# Patient Record
Sex: MALE | Race: WHITE | NOT HISPANIC OR LATINO | Employment: OTHER | URBAN - METROPOLITAN AREA
[De-identification: names, ages, dates, MRNs, and addresses within clinical notes are randomized per-mention and may not be internally consistent; named-entity substitution may affect disease eponyms.]

---

## 2017-05-03 ENCOUNTER — GENERIC CONVERSION - ENCOUNTER (OUTPATIENT)
Dept: OTHER | Facility: OTHER | Age: 82
End: 2017-05-03

## 2017-09-13 ENCOUNTER — ALLSCRIPTS OFFICE VISIT (OUTPATIENT)
Dept: OTHER | Facility: OTHER | Age: 82
End: 2017-09-13

## 2017-09-14 ENCOUNTER — GENERIC CONVERSION - ENCOUNTER (OUTPATIENT)
Dept: OTHER | Facility: OTHER | Age: 82
End: 2017-09-14

## 2017-09-14 ENCOUNTER — ALLSCRIPTS OFFICE VISIT (OUTPATIENT)
Dept: OTHER | Facility: OTHER | Age: 82
End: 2017-09-14

## 2017-10-11 ENCOUNTER — LAB CONVERSION - ENCOUNTER (OUTPATIENT)
Dept: OTHER | Facility: OTHER | Age: 82
End: 2017-10-11

## 2017-10-11 LAB
A/G RATIO (HISTORICAL): 1.7 (CALC) (ref 1–2.5)
ALBUMIN SERPL BCP-MCNC: 4.3 G/DL (ref 3.6–5.1)
ALP SERPL-CCNC: 76 U/L (ref 40–115)
ALT SERPL W P-5'-P-CCNC: 20 U/L (ref 9–46)
AST SERPL W P-5'-P-CCNC: 22 U/L (ref 10–35)
BILIRUB SERPL-MCNC: 0.7 MG/DL (ref 0.2–1.2)
BUN SERPL-MCNC: 19 MG/DL (ref 7–25)
BUN/CREA RATIO (HISTORICAL): ABNORMAL (CALC) (ref 6–22)
CALCIUM SERPL-MCNC: 9.4 MG/DL (ref 8.6–10.3)
CHLORIDE SERPL-SCNC: 104 MMOL/L (ref 98–110)
CHOLEST SERPL-MCNC: 203 MG/DL
CHOLEST/HDLC SERPL: 3.3 (CALC)
CO2 SERPL-SCNC: 29 MMOL/L (ref 20–31)
CREAT SERPL-MCNC: 0.97 MG/DL (ref 0.7–1.11)
EGFR AFRICAN AMERICAN (HISTORICAL): 85 ML/MIN/1.73M2
EGFR-AMERICAN CALC (HISTORICAL): 73 ML/MIN/1.73M2
GAMMA GLOBULIN (HISTORICAL): 2.6 G/DL (CALC) (ref 1.9–3.7)
GLUCOSE (HISTORICAL): 100 MG/DL (ref 65–99)
HDLC SERPL-MCNC: 61 MG/DL
LDL CHOLESTEROL (HISTORICAL): 121 MG/DL (CALC)
NON-HDL-CHOL (CHOL-HDL) (HISTORICAL): 142 MG/DL (CALC)
POTASSIUM SERPL-SCNC: 4.5 MMOL/L (ref 3.5–5.3)
SODIUM SERPL-SCNC: 139 MMOL/L (ref 135–146)
TOTAL PROTEIN (HISTORICAL): 6.9 G/DL (ref 6.1–8.1)
TRIGL SERPL-MCNC: 100 MG/DL

## 2017-10-12 ENCOUNTER — LAB CONVERSION - ENCOUNTER (OUTPATIENT)
Dept: OTHER | Facility: OTHER | Age: 82
End: 2017-10-12

## 2017-10-12 LAB
A/G RATIO (HISTORICAL): 1.7 (CALC) (ref 1–2.5)
ALBUMIN SERPL BCP-MCNC: 4.3 G/DL (ref 3.6–5.1)
ALP SERPL-CCNC: 76 U/L (ref 40–115)
ALT SERPL W P-5'-P-CCNC: 20 U/L (ref 9–46)
AST SERPL W P-5'-P-CCNC: 22 U/L (ref 10–35)
BILIRUB SERPL-MCNC: 0.7 MG/DL (ref 0.2–1.2)
BUN SERPL-MCNC: 19 MG/DL (ref 7–25)
BUN/CREA RATIO (HISTORICAL): ABNORMAL (CALC) (ref 6–22)
CALCIUM SERPL-MCNC: 9.4 MG/DL (ref 8.6–10.3)
CHLORIDE SERPL-SCNC: 104 MMOL/L (ref 98–110)
CHOLEST SERPL-MCNC: 203 MG/DL
CHOLEST/HDLC SERPL: 3.3 (CALC)
CO2 SERPL-SCNC: 29 MMOL/L (ref 20–31)
CREAT SERPL-MCNC: 0.97 MG/DL (ref 0.7–1.11)
DEPRECATED RDW RBC AUTO: 13.6 % (ref 11–15)
EGFR AFRICAN AMERICAN (HISTORICAL): 85 ML/MIN/1.73M2
EGFR-AMERICAN CALC (HISTORICAL): 73 ML/MIN/1.73M2
GAMMA GLOBULIN (HISTORICAL): 2.6 G/DL (CALC) (ref 1.9–3.7)
GLUCOSE (HISTORICAL): 100 MG/DL (ref 65–99)
HCT VFR BLD AUTO: 49.4 % (ref 38.5–50)
HDLC SERPL-MCNC: 61 MG/DL
HGB BLD-MCNC: 16.7 G/DL (ref 13.2–17.1)
LDL CHOLESTEROL (HISTORICAL): 121 MG/DL (CALC)
MCH RBC QN AUTO: 31.2 PG (ref 27–33)
MCHC RBC AUTO-ENTMCNC: 33.8 G/DL (ref 32–36)
MCV RBC AUTO: 92.2 FL (ref 80–100)
NON-HDL-CHOL (CHOL-HDL) (HISTORICAL): 142 MG/DL (CALC)
PLATELET # BLD AUTO: 145 THOUSAND/UL (ref 140–400)
PMV BLD AUTO: 11 FL (ref 7.5–12.5)
POTASSIUM SERPL-SCNC: 4.5 MMOL/L (ref 3.5–5.3)
RBC # BLD AUTO: 5.36 MILLION/UL (ref 4.2–5.8)
SODIUM SERPL-SCNC: 139 MMOL/L (ref 135–146)
TOTAL PROTEIN (HISTORICAL): 6.9 G/DL (ref 6.1–8.1)
TRIGL SERPL-MCNC: 100 MG/DL
TSH SERPL DL<=0.05 MIU/L-ACNC: 3.5 MIU/L (ref 0.4–4.5)
WBC # BLD AUTO: 5.4 THOUSAND/UL (ref 3.8–10.8)

## 2017-10-16 ENCOUNTER — ALLSCRIPTS OFFICE VISIT (OUTPATIENT)
Dept: OTHER | Facility: OTHER | Age: 82
End: 2017-10-16

## 2017-10-16 DIAGNOSIS — R01.1 CARDIAC MURMUR: ICD-10-CM

## 2017-11-17 ENCOUNTER — GENERIC CONVERSION - ENCOUNTER (OUTPATIENT)
Dept: OTHER | Facility: OTHER | Age: 82
End: 2017-11-17

## 2017-11-17 ENCOUNTER — HOSPITAL ENCOUNTER (OUTPATIENT)
Dept: NON INVASIVE DIAGNOSTICS | Facility: HOSPITAL | Age: 82
Discharge: HOME | End: 2017-11-17
Attending: FAMILY MEDICINE
Payer: COMMERCIAL

## 2017-11-17 DIAGNOSIS — R01.1 CARDIAC MURMUR: ICD-10-CM

## 2017-11-17 PROCEDURE — 93306 TTE W/DOPPLER COMPLETE: CPT

## 2018-01-11 ENCOUNTER — GENERIC CONVERSION - ENCOUNTER (OUTPATIENT)
Dept: OTHER | Facility: OTHER | Age: 83
End: 2018-01-11

## 2018-01-11 NOTE — PROGRESS NOTES
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)   9  Plantar fasciitis (728 71) (M72 2)   10  Primary hypothyroidism (244 9) (E03 9)   11  Prostate cancer screening (V76 44) (Z12 5)   12  PVCs (premature ventricular contractions) (427 69) (I49 3)   13  Right knee pain (719 46) (M25 561)   14  Screening for cardiovascular condition (V81 2) (Z13 6)   15  Screening for thyroid disorder (V77 0) (Z13 29)   16  Seborrheic keratosis (702 19) (L82 1)   17  Throat pain in adult (784 1) (R07 0)    Current Meds   1  Atorvastatin Calcium 10 MG Oral Tablet; TAKE 1 TABLET DAILY  Requested for:   63Stc9209; Last Rx:[id number] Ordered   2  Levothyroxine Sodium 75 MCG Oral Tablet; TAKE 1 TABLET DAILY  Requested for:   62Btt5698; Last Rx:[id number] Ordered    Allergies    1  Omnicef CAPS    2  No Known Latex Allergies    Vitals  Signs    Systolic: 556, LUE, Sitting  Diastolic: 88, LUE, Sitting    Discussion/Summary    Cont to monitor instructions        Signatures   Electronically signed by : TOÑO Donohue ; Sep 20 2016  9:37AM EST                       (Author)

## 2018-01-13 NOTE — MISCELLANEOUS
Message  Phone call to patient as his blood pressure was 78/52 this am in the office  Patient reported that he is feeling fine, denied any dizziness or feeling lightheaded  He is eating and drinking fine  Asked patient to come into the office tomorrow morning for a BP check, he will come into the office about 8:30-9:00 tomorrow morning  Vahid Corona RN      Active Problems    1  Benign essential hypertension (401 1) (I10)   2  Body mass index (BMI) 24 0-24 9, adult (V85 1) (Z68 24)   3  Elevated prostate specific antigen (PSA) (790 93) (R97 20)   4  Encounter for screening for malignant neoplasm of colon (V76 51) (Z12 11)   5  Hyperlipidemia (272 4) (E78 5)   6  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)   7  Lumbago (724 2) (M54 5)   8  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)   9  Plantar fasciitis (728 71) (M72 2)   10  Primary hypothyroidism (244 9) (E03 9)   11  Prostate cancer screening (V76 44) (Z12 5)   12  PVCs (premature ventricular contractions) (427 69) (I49 3)   13  Right knee pain (719 46) (M25 561)   14  Screening for cardiovascular condition (V81 2) (Z13 6)   15  Screening for thyroid disorder (V77 0) (Z13 29)   16  Seborrheic keratosis (702 19) (L82 1)   17  Throat pain in adult (784 1) (R07 0)   18  Tracheostomy status (V44 0) (Z93 0)    Current Meds   1  Atorvastatin Calcium 10 MG Oral Tablet; TAKE 1 TABLET DAILY  Requested for:   51BBY3538; Last HP:[id number] Ordered   2  Levothyroxine Sodium 75 MCG Oral Tablet; TAKE 1 TABLET DAILY  Requested for:   27ILG8975; Last TY:[id number] Ordered    Allergies    1  Omnicef CAPS    2  No Known Latex Allergies    Signatures   Electronically signed by :  WALDEMAR Adams ; Sep 13 2017  3:36PM EST                       (Author)

## 2018-01-15 VITALS
RESPIRATION RATE: 18 BRPM | HEIGHT: 68 IN | BODY MASS INDEX: 25.34 KG/M2 | DIASTOLIC BLOOD PRESSURE: 78 MMHG | SYSTOLIC BLOOD PRESSURE: 108 MMHG | OXYGEN SATURATION: 94 % | WEIGHT: 167.19 LBS | HEART RATE: 82 BPM

## 2018-01-16 ENCOUNTER — LAB CONVERSION - ENCOUNTER (OUTPATIENT)
Dept: OTHER | Facility: OTHER | Age: 83
End: 2018-01-16

## 2018-01-16 LAB — TSH SERPL DL<=0.05 MIU/L-ACNC: 2.4 MIU/L (ref 0.4–4.5)

## 2018-01-22 VITALS
SYSTOLIC BLOOD PRESSURE: 78 MMHG | WEIGHT: 160.38 LBS | OXYGEN SATURATION: 93 % | HEIGHT: 68 IN | TEMPERATURE: 97.2 F | DIASTOLIC BLOOD PRESSURE: 52 MMHG | HEART RATE: 64 BPM | RESPIRATION RATE: 18 BRPM | BODY MASS INDEX: 24.31 KG/M2

## 2018-01-22 VITALS — HEART RATE: 68 BPM | DIASTOLIC BLOOD PRESSURE: 60 MMHG | SYSTOLIC BLOOD PRESSURE: 100 MMHG

## 2018-01-23 ENCOUNTER — GENERIC CONVERSION - ENCOUNTER (OUTPATIENT)
Dept: OTHER | Facility: OTHER | Age: 83
End: 2018-01-23

## 2018-01-23 NOTE — PROGRESS NOTES
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)   8  Plantar fasciitis (728 71) (M72 2)   9  Primary hypothyroidism (244 9) (E03 9)   10  Prostate cancer screening (V76 44) (Z12 5)   11  PVCs (premature ventricular contractions) (427 69) (I49 3)   12  Right knee pain (719 46) (M25 561)   13  Screening for cardiovascular condition (V81 2) (Z13 6)   14  Screening for thyroid disorder (V77 0) (Z13 29)   15  Seborrheic keratosis (702 19) (L82 1)   16  Throat pain in adult (784 1) (R07 0)   17  Tracheostomy status (V44 0) (Z93 0)    Benign essential hypertension (401 1) (I10)       Hyperlipidemia (272 4) (E78 5)          Current Meds   1  Atorvastatin Calcium 10 MG Oral Tablet; TAKE 1 TABLET DAILY  Requested for:   61CJY5522; Last [id number] Ordered   2  Levothyroxine Sodium 75 MCG Oral Tablet; TAKE 1 TABLET DAILY  Requested for:   05PLB1278; Last [id number] Ordered    Allergies    1  Omnicef CAPS    2  No Known Latex Allergies    Vitals  Signs    Heart Rate: 68  Systolic: 741  Diastolic: 60    Signatures   Electronically signed by :  WALDEMAR Doe ; Dec  7 2017 11:25AM EST                       (Author)

## 2018-01-24 VITALS
SYSTOLIC BLOOD PRESSURE: 110 MMHG | WEIGHT: 160.31 LBS | HEART RATE: 77 BPM | DIASTOLIC BLOOD PRESSURE: 80 MMHG | HEIGHT: 68 IN | RESPIRATION RATE: 18 BRPM | BODY MASS INDEX: 24.3 KG/M2 | OXYGEN SATURATION: 98 % | TEMPERATURE: 98.1 F

## 2018-02-02 ENCOUNTER — APPOINTMENT (OUTPATIENT)
Dept: LAB | Facility: HOSPITAL | Age: 83
End: 2018-02-02
Attending: OPHTHALMOLOGY
Payer: COMMERCIAL

## 2018-02-02 ENCOUNTER — TRANSCRIBE ORDERS (OUTPATIENT)
Dept: ADMINISTRATIVE | Facility: HOSPITAL | Age: 83
End: 2018-02-02

## 2018-02-02 DIAGNOSIS — M31.6 TEMPORAL ARTERITIS (HCC): Primary | ICD-10-CM

## 2018-02-02 LAB
CRP SERPL QL: 30.1 MG/L
ERYTHROCYTE [SEDIMENTATION RATE] IN BLOOD: 43 MM/HOUR (ref 2–10)
PLATELET # BLD AUTO: 200 THOUSANDS/UL (ref 130–400)
PMV BLD AUTO: 7.2 FL (ref 8.9–12.7)

## 2018-02-02 PROCEDURE — 86140 C-REACTIVE PROTEIN: CPT | Performed by: OPHTHALMOLOGY

## 2018-02-02 PROCEDURE — 85652 RBC SED RATE AUTOMATED: CPT | Performed by: OPHTHALMOLOGY

## 2018-02-02 PROCEDURE — 85049 AUTOMATED PLATELET COUNT: CPT | Performed by: OPHTHALMOLOGY

## 2018-02-02 PROCEDURE — 36415 COLL VENOUS BLD VENIPUNCTURE: CPT | Performed by: OPHTHALMOLOGY

## 2018-02-05 ENCOUNTER — APPOINTMENT (OUTPATIENT)
Dept: LAB | Facility: HOSPITAL | Age: 83
End: 2018-02-05
Attending: SURGERY
Payer: COMMERCIAL

## 2018-02-05 ENCOUNTER — TRANSCRIBE ORDERS (OUTPATIENT)
Dept: ADMINISTRATIVE | Facility: HOSPITAL | Age: 83
End: 2018-02-05

## 2018-02-05 ENCOUNTER — TELEPHONE (OUTPATIENT)
Dept: FAMILY MEDICINE CLINIC | Facility: CLINIC | Age: 83
End: 2018-02-05

## 2018-02-05 DIAGNOSIS — G89.29 CHRONIC INTRACTABLE HEADACHE, UNSPECIFIED HEADACHE TYPE: Primary | ICD-10-CM

## 2018-02-05 DIAGNOSIS — R51.9 CHRONIC INTRACTABLE HEADACHE, UNSPECIFIED HEADACHE TYPE: Primary | ICD-10-CM

## 2018-02-05 PROCEDURE — 93005 ELECTROCARDIOGRAM TRACING: CPT

## 2018-02-06 LAB
ATRIAL RATE: 78 BPM
P AXIS: 52 DEGREES
PR INTERVAL: 154 MS
QRS AXIS: 8 DEGREES
QRSD INTERVAL: 80 MS
QT INTERVAL: 388 MS
QTC INTERVAL: 442 MS
T WAVE AXIS: 42 DEGREES
VENTRICULAR RATE: 78 BPM

## 2018-02-06 PROCEDURE — 93010 ELECTROCARDIOGRAM REPORT: CPT | Performed by: INTERNAL MEDICINE

## 2018-02-07 ENCOUNTER — TELEPHONE (OUTPATIENT)
Dept: FAMILY MEDICINE CLINIC | Facility: CLINIC | Age: 83
End: 2018-02-07

## 2018-02-09 ENCOUNTER — TELEPHONE (OUTPATIENT)
Dept: FAMILY MEDICINE CLINIC | Facility: CLINIC | Age: 83
End: 2018-02-09

## 2018-02-09 NOTE — TELEPHONE ENCOUNTER
PT REQUESTING A CALL FROM  [facility] PERTAINING TO HIS MEDICAL ISSUES  PLEASE CALL TODAY OR FLACO PLEASE

## 2018-02-16 ENCOUNTER — TELEPHONE (OUTPATIENT)
Dept: FAMILY MEDICINE CLINIC | Facility: CLINIC | Age: 83
End: 2018-02-16

## 2018-02-16 NOTE — TELEPHONE ENCOUNTER
Dr Melanie Elmore asks about steroids,has been on them for 9 days  asks if you want him to stop or do you need to see him?pt can be reached after 2 pm

## 2018-02-21 ENCOUNTER — TELEPHONE (OUTPATIENT)
Dept: FAMILY MEDICINE CLINIC | Facility: CLINIC | Age: 83
End: 2018-02-21

## 2018-02-21 NOTE — TELEPHONE ENCOUNTER
Please call pt , he needs to speak to you about himself  he said you know what it is about, pills brothering him   [phone]

## 2018-02-22 DIAGNOSIS — K21.9 GASTROESOPHAGEAL REFLUX DISEASE WITHOUT ESOPHAGITIS: Primary | ICD-10-CM

## 2018-02-22 RX ORDER — OMEPRAZOLE 20 MG/1
20 CAPSULE, DELAYED RELEASE ORAL DAILY
Qty: 30 CAPSULE | Refills: 2 | Status: SHIPPED | OUTPATIENT
Start: 2018-02-22 | End: 2018-05-31

## 2018-02-26 ENCOUNTER — OFFICE VISIT (OUTPATIENT)
Dept: FAMILY MEDICINE CLINIC | Facility: CLINIC | Age: 83
End: 2018-02-26
Payer: COMMERCIAL

## 2018-02-26 VITALS
WEIGHT: 159 LBS | BODY MASS INDEX: 24.1 KG/M2 | OXYGEN SATURATION: 97 % | SYSTOLIC BLOOD PRESSURE: 116 MMHG | RESPIRATION RATE: 18 BRPM | HEIGHT: 68 IN | TEMPERATURE: 97.5 F | DIASTOLIC BLOOD PRESSURE: 70 MMHG | HEART RATE: 80 BPM

## 2018-02-26 DIAGNOSIS — M31.6 TEMPORAL ARTERITIS (HCC): Primary | ICD-10-CM

## 2018-02-26 DIAGNOSIS — I10 BENIGN ESSENTIAL HYPERTENSION: ICD-10-CM

## 2018-02-26 PROBLEM — I34.0 MITRAL REGURGITATION: Status: ACTIVE | Noted: 2017-11-17

## 2018-02-26 PROBLEM — R01.1 SYSTOLIC MURMUR: Status: ACTIVE | Noted: 2017-10-16

## 2018-02-26 PROBLEM — I35.0 AORTIC STENOSIS: Status: ACTIVE | Noted: 2017-11-17

## 2018-02-26 PROBLEM — M54.2 NECK PAIN: Status: ACTIVE | Noted: 2018-01-12

## 2018-02-26 PROCEDURE — 3078F DIAST BP <80 MM HG: CPT | Performed by: FAMILY MEDICINE

## 2018-02-26 PROCEDURE — 99213 OFFICE O/P EST LOW 20 MIN: CPT | Performed by: FAMILY MEDICINE

## 2018-02-26 PROCEDURE — 3074F SYST BP LT 130 MM HG: CPT | Performed by: FAMILY MEDICINE

## 2018-02-26 RX ORDER — LEVOTHYROXINE SODIUM 0.07 MG/1
1 TABLET ORAL DAILY
COMMUNITY
End: 2018-12-03 | Stop reason: SDUPTHER

## 2018-02-26 RX ORDER — ATORVASTATIN CALCIUM 10 MG/1
1 TABLET, FILM COATED ORAL DAILY
COMMUNITY
End: 2018-12-03 | Stop reason: SDUPTHER

## 2018-02-26 NOTE — PROGRESS NOTES
Assessment/Plan:    No problem-specific Assessment & Plan notes found for this encounter  Diagnoses and all orders for this visit:    Temporal arteritis ([address] )    Benign essential hypertension    Other orders  -     atorvastatin (LIPITOR) 10 mg tablet; Take 1 tablet by mouth daily  -     levothyroxine 75 mcg tablet; Take 1 tablet by mouth daily          Subjective:      Patient ID: Jobe Hodgkin is a 81 y o  male  HPI    The following portions of the patient's history were reviewed and updated as appropriate: past family history, past medical history, past social history and past surgical history      Review of Systems      Objective:      /70   Pulse 80   Temp 97 5 °F (36 4 °C)   Resp 18   Ht 5' 8" (1 727 m)   Wt 72 1 kg (159 lb)   SpO2 97%   BMI 24 18 kg/m²          Physical Exam

## 2018-02-26 NOTE — PROGRESS NOTES
Assessment/Plan:    Will cont pred at 80 and monitor  call next week again brought up seeing rheum  Cont ppi     There are no diagnoses linked to this encounter  Subjective:      Patient ID: Eneida Xie is a 81 y o  male  Patient seen in f/u for temp arteritis   Is doing better on 80  Mg  Is having some side effects  Of insomnia     Has some questions about length of treatment otherwise bp okay  No visual sx         The following portions of the patient's history were reviewed and updated as appropriate: past family history, past medical history, past social history and past surgical history  Review of Systems   Constitutional: Negative  HENT:        See hpi   Eyes: Negative  Respiratory: Negative  Cardiovascular: Negative  Gastrointestinal: Negative  Endocrine: Negative  Genitourinary: Negative  Musculoskeletal: Negative  Skin: Negative  Neurological:        See hpi   Psychiatric/Behavioral: Negative  Objective:      /70   Pulse 80   Temp 97 5 °F (36 4 °C)   Resp 18   Ht 5' 8" (1 727 m)   Wt 72 1 kg (159 lb)   SpO2 97%   BMI 24 18 kg/m²          Physical Exam   Constitutional: He is oriented to person, place, and time  He appears well-developed and well-nourished  HENT:   Head: Normocephalic and atraumatic  Right Ear: External ear normal    Left Ear: External ear normal    Min tenderness temporal arteries   Eyes: Conjunctivae are normal  Pupils are equal, round, and reactive to light  Neck: Normal range of motion  Neck supple  Cardiovascular: Normal rate, regular rhythm and normal heart sounds  Pulmonary/Chest: Effort normal and breath sounds normal    Abdominal: Soft  Bowel sounds are normal    Musculoskeletal: Normal range of motion  Neurological: He is alert and oriented to person, place, and time  Skin: Skin is warm  Psychiatric: He has a normal mood and affect   His behavior is normal

## 2018-02-26 NOTE — PROGRESS NOTES
Patient seen in f/u for  Temp  Arteritis has noted  Some improvement  Noted with  80 mg pred  Not gone   But having some side effects   Not sleeping   Is on stomach meds as well

## 2018-03-05 ENCOUNTER — TELEPHONE (OUTPATIENT)
Dept: FAMILY MEDICINE CLINIC | Facility: CLINIC | Age: 83
End: 2018-03-05

## 2018-03-15 ENCOUNTER — TELEPHONE (OUTPATIENT)
Dept: FAMILY MEDICINE CLINIC | Facility: CLINIC | Age: 83
End: 2018-03-15

## 2018-03-15 DIAGNOSIS — M31.6 TEMPORAL ARTERITIS (HCC): Primary | ICD-10-CM

## 2018-03-20 LAB — CRP SERPL-MCNC: 30.5 MG/L

## 2018-03-30 ENCOUNTER — TELEPHONE (OUTPATIENT)
Dept: FAMILY MEDICINE CLINIC | Facility: CLINIC | Age: 83
End: 2018-03-30

## 2018-03-30 DIAGNOSIS — Z43.0 TRACHEOSTOMY CARE (HCC): Primary | ICD-10-CM

## 2018-04-02 ENCOUNTER — TELEPHONE (OUTPATIENT)
Dept: FAMILY MEDICINE CLINIC | Facility: CLINIC | Age: 83
End: 2018-04-02

## 2018-04-02 DIAGNOSIS — M31.6 TEMPORAL ARTERITIS (HCC): Primary | ICD-10-CM

## 2018-04-02 RX ORDER — PREDNISONE 20 MG/1
TABLET ORAL
Qty: 120 TABLET | Refills: 5 | Status: SHIPPED | OUTPATIENT
Start: 2018-04-02 | End: 2018-08-30

## 2018-04-02 NOTE — TELEPHONE ENCOUNTER
Pt called to request a call back from you re biopsy  also needs a refill of prednisone,he will run out next week when you are away

## 2018-04-16 ENCOUNTER — TELEPHONE (OUTPATIENT)
Dept: FAMILY MEDICINE CLINIC | Facility: CLINIC | Age: 83
End: 2018-04-16

## 2018-04-17 ENCOUNTER — HOSPITAL ENCOUNTER (OUTPATIENT)
Dept: RADIOLOGY | Facility: HOSPITAL | Age: 83
Discharge: HOME | End: 2018-04-17
Payer: COMMERCIAL

## 2018-04-17 ENCOUNTER — TRANSCRIBE ORDERS (OUTPATIENT)
Dept: ADMINISTRATIVE | Facility: HOSPITAL | Age: 83
End: 2018-04-17

## 2018-04-17 DIAGNOSIS — R91.8 LUNG MASS: Primary | ICD-10-CM

## 2018-04-17 DIAGNOSIS — M54.2 CERVICALGIA: ICD-10-CM

## 2018-04-17 DIAGNOSIS — R91.8 LUNG MASS: ICD-10-CM

## 2018-04-17 PROCEDURE — 72040 X-RAY EXAM NECK SPINE 2-3 VW: CPT

## 2018-04-17 PROCEDURE — 71046 X-RAY EXAM CHEST 2 VIEWS: CPT

## 2018-04-23 LAB
BASOPHILS # BLD AUTO: 17 CELLS/UL (ref 0–200)
BASOPHILS NFR BLD AUTO: 0.2 %
BUN SERPL-MCNC: 23 MG/DL (ref 7–25)
BUN/CREAT SERPL: NORMAL (CALC) (ref 6–22)
CALCIUM SERPL-MCNC: 8.7 MG/DL (ref 8.6–10.3)
CHLORIDE SERPL-SCNC: 101 MMOL/L (ref 98–110)
CO2 SERPL-SCNC: 31 MMOL/L (ref 20–31)
CREAT SERPL-MCNC: 0.96 MG/DL (ref 0.7–1.11)
CRP SERPL-MCNC: 9.3 MG/L
EOSINOPHIL # BLD AUTO: 17 CELLS/UL (ref 15–500)
EOSINOPHIL NFR BLD AUTO: 0.2 %
ERYTHROCYTE [DISTWIDTH] IN BLOOD BY AUTOMATED COUNT: 16 % (ref 11–15)
ERYTHROCYTE [SEDIMENTATION RATE] IN BLOOD BY WESTERGREN METHOD: 2 MM/H
GLUCOSE SERPL-MCNC: 71 MG/DL (ref 65–99)
HCT VFR BLD AUTO: 47.5 % (ref 38.5–50)
HGB BLD-MCNC: 15.6 G/DL (ref 13.2–17.1)
LYMPHOCYTES # BLD AUTO: 756 CELLS/UL (ref 850–3900)
LYMPHOCYTES NFR BLD AUTO: 9 %
MCH RBC QN AUTO: 29.9 PG (ref 27–33)
MCHC RBC AUTO-ENTMCNC: 32.8 G/DL (ref 32–36)
MCV RBC AUTO: 91 FL (ref 80–100)
MONOCYTES # BLD AUTO: 655 CELLS/UL (ref 200–950)
MONOCYTES NFR BLD AUTO: 7.8 %
NEUTROPHILS # BLD AUTO: 6955 CELLS/UL (ref 1500–7800)
NEUTROPHILS NFR BLD AUTO: 82.8 %
PLATELET # BLD AUTO: 112 THOUSAND/UL (ref 140–400)
PMV BLD REES-ECKER: 11.7 FL (ref 7.5–12.5)
POTASSIUM SERPL-SCNC: 4.2 MMOL/L (ref 3.5–5.3)
RBC # BLD AUTO: 5.22 MILLION/UL (ref 4.2–5.8)
SL AMB EGFR AFRICAN AMERICAN: 86 ML/MIN/1.73M2
SL AMB EGFR NON AFRICAN AMERICAN: 74 ML/MIN/1.73M2
SODIUM SERPL-SCNC: 140 MMOL/L (ref 135–146)
WBC # BLD AUTO: 8.4 THOUSAND/UL (ref 3.8–10.8)

## 2018-05-14 LAB
BASOPHILS # BLD AUTO: 8 CELLS/UL (ref 0–200)
BASOPHILS NFR BLD AUTO: 0.1 %
BUN SERPL-MCNC: 26 MG/DL (ref 7–25)
BUN/CREAT SERPL: 27 (CALC) (ref 6–22)
CALCIUM SERPL-MCNC: 8.6 MG/DL (ref 8.6–10.3)
CHLORIDE SERPL-SCNC: 104 MMOL/L (ref 98–110)
CO2 SERPL-SCNC: 29 MMOL/L (ref 20–31)
CREAT SERPL-MCNC: 0.96 MG/DL (ref 0.7–1.11)
CRP SERPL-MCNC: 9 MG/L
EOSINOPHIL # BLD AUTO: 23 CELLS/UL (ref 15–500)
EOSINOPHIL NFR BLD AUTO: 0.3 %
ERYTHROCYTE [DISTWIDTH] IN BLOOD BY AUTOMATED COUNT: 16 % (ref 11–15)
ERYTHROCYTE [SEDIMENTATION RATE] IN BLOOD BY WESTERGREN METHOD: 6 MM/H
GLUCOSE SERPL-MCNC: 62 MG/DL (ref 65–139)
HCT VFR BLD AUTO: 48 % (ref 38.5–50)
HGB BLD-MCNC: 15.8 G/DL (ref 13.2–17.1)
LYMPHOCYTES # BLD AUTO: 699 CELLS/UL (ref 850–3900)
LYMPHOCYTES NFR BLD AUTO: 9.2 %
MCH RBC QN AUTO: 30.3 PG (ref 27–33)
MCHC RBC AUTO-ENTMCNC: 32.9 G/DL (ref 32–36)
MCV RBC AUTO: 92 FL (ref 80–100)
MONOCYTES # BLD AUTO: 806 CELLS/UL (ref 200–950)
MONOCYTES NFR BLD AUTO: 10.6 %
NEUTROPHILS # BLD AUTO: 6065 CELLS/UL (ref 1500–7800)
NEUTROPHILS NFR BLD AUTO: 79.8 %
PLATELET # BLD AUTO: ABNORMAL THOUSAND/UL
POTASSIUM SERPL-SCNC: 4 MMOL/L (ref 3.5–5.3)
RBC # BLD AUTO: 5.22 MILLION/UL (ref 4.2–5.8)
SERVICE CMNT-IMP: ABNORMAL
SL AMB EGFR AFRICAN AMERICAN: 86 ML/MIN/1.73M2
SL AMB EGFR NON AFRICAN AMERICAN: 74 ML/MIN/1.73M2
SODIUM SERPL-SCNC: 140 MMOL/L (ref 135–146)
WBC # BLD AUTO: 7.6 THOUSAND/UL (ref 3.8–10.8)

## 2018-05-31 ENCOUNTER — APPOINTMENT (EMERGENCY)
Dept: RADIOLOGY | Facility: HOSPITAL | Age: 83
End: 2018-05-31
Payer: COMMERCIAL

## 2018-05-31 ENCOUNTER — HOSPITAL ENCOUNTER (OUTPATIENT)
Facility: HOSPITAL | Age: 83
Setting detail: OBSERVATION
Discharge: HOME | End: 2018-06-01
Attending: EMERGENCY MEDICINE | Admitting: FAMILY MEDICINE
Payer: COMMERCIAL

## 2018-05-31 ENCOUNTER — OFFICE VISIT (OUTPATIENT)
Dept: FAMILY MEDICINE CLINIC | Facility: CLINIC | Age: 83
End: 2018-05-31
Payer: COMMERCIAL

## 2018-05-31 VITALS
HEIGHT: 68 IN | BODY MASS INDEX: 24.25 KG/M2 | OXYGEN SATURATION: 95 % | DIASTOLIC BLOOD PRESSURE: 62 MMHG | RESPIRATION RATE: 20 BRPM | WEIGHT: 160 LBS | HEART RATE: 48 BPM | SYSTOLIC BLOOD PRESSURE: 90 MMHG | TEMPERATURE: 96.8 F

## 2018-05-31 DIAGNOSIS — I35.0 AORTIC VALVE STENOSIS, ETIOLOGY OF CARDIAC VALVE DISEASE UNSPECIFIED: ICD-10-CM

## 2018-05-31 DIAGNOSIS — I95.9 HYPOTENSION, UNSPECIFIED HYPOTENSION TYPE: Primary | ICD-10-CM

## 2018-05-31 DIAGNOSIS — M31.6 TEMPORAL ARTERITIS (HCC): ICD-10-CM

## 2018-05-31 DIAGNOSIS — R06.02 SHORTNESS OF BREATH: ICD-10-CM

## 2018-05-31 DIAGNOSIS — R05.9 COUGH: ICD-10-CM

## 2018-05-31 DIAGNOSIS — R77.8 ELEVATED TROPONIN: ICD-10-CM

## 2018-05-31 DIAGNOSIS — R42 DIZZINESS: ICD-10-CM

## 2018-05-31 DIAGNOSIS — R53.1 WEAKNESS: Primary | ICD-10-CM

## 2018-05-31 PROBLEM — R79.89 ELEVATED BRAIN NATRIURETIC PEPTIDE (BNP) LEVEL: Status: ACTIVE | Noted: 2018-05-31

## 2018-05-31 PROBLEM — E87.6 HYPOKALEMIA: Status: ACTIVE | Noted: 2018-05-31

## 2018-05-31 PROBLEM — R53.83 FATIGUE: Status: ACTIVE | Noted: 2018-05-31

## 2018-05-31 PROBLEM — I10 HYPERTENSION: Status: ACTIVE | Noted: 2018-05-31

## 2018-05-31 LAB
ALBUMIN SERPL BCP-MCNC: 2.5 G/DL (ref 3.5–5)
ALP SERPL-CCNC: 45 U/L (ref 46–116)
ALT SERPL W P-5'-P-CCNC: 39 U/L (ref 12–78)
ANION GAP SERPL CALCULATED.3IONS-SCNC: 5 MMOL/L (ref 4–13)
APTT PPP: 23 SECONDS (ref 24–33)
AST SERPL W P-5'-P-CCNC: 19 U/L (ref 5–45)
BASOPHILS # BLD AUTO: 0 THOUSANDS/ΜL (ref 0–0.1)
BASOPHILS NFR BLD AUTO: 0 % (ref 0–1)
BILIRUB SERPL-MCNC: 0.4 MG/DL (ref 0.2–1)
BILIRUB UR QL STRIP: NEGATIVE
BUN SERPL-MCNC: 26 MG/DL (ref 5–25)
CALCIUM SERPL-MCNC: 7.9 MG/DL (ref 8.3–10.1)
CHLORIDE SERPL-SCNC: 104 MMOL/L (ref 100–108)
CLARITY UR: CLEAR
CO2 SERPL-SCNC: 30 MMOL/L (ref 21–32)
COLOR UR: YELLOW
CORTIS SERPL-MCNC: 42.9 UG/DL
CREAT SERPL-MCNC: 0.94 MG/DL (ref 0.6–1.3)
EOSINOPHIL # BLD AUTO: 0.05 THOUSAND/ΜL (ref 0–0.61)
EOSINOPHIL NFR BLD AUTO: 1 % (ref 0–6)
ERYTHROCYTE [DISTWIDTH] IN BLOOD BY AUTOMATED COUNT: 16.9 % (ref 11.6–15.1)
GFR SERPL CREATININE-BSD FRML MDRD: 75 ML/MIN/1.73SQ M
GLUCOSE SERPL-MCNC: 94 MG/DL (ref 65–140)
GLUCOSE UR STRIP-MCNC: NEGATIVE MG/DL
HCT VFR BLD AUTO: 43.4 % (ref 36.5–49.3)
HGB BLD-MCNC: 14 G/DL (ref 12–17)
HGB UR QL STRIP.AUTO: NEGATIVE
IMM GRANULOCYTES # BLD AUTO: 0.05 THOUSAND/UL (ref 0–0.2)
IMM GRANULOCYTES NFR BLD AUTO: 1 % (ref 0–2)
INR PPP: 1.03 (ref 0.86–1.16)
KETONES UR STRIP-MCNC: NEGATIVE MG/DL
LEUKOCYTE ESTERASE UR QL STRIP: NEGATIVE
LYMPHOCYTES # BLD AUTO: 0.81 THOUSANDS/ΜL (ref 0.6–4.47)
LYMPHOCYTES NFR BLD AUTO: 10 % (ref 14–44)
MAGNESIUM SERPL-MCNC: 1.8 MG/DL (ref 1.6–2.6)
MCH RBC QN AUTO: 30.8 PG (ref 26.8–34.3)
MCHC RBC AUTO-ENTMCNC: 32.3 G/DL (ref 31.4–37.4)
MCV RBC AUTO: 96 FL (ref 82–98)
MONOCYTES # BLD AUTO: 0.76 THOUSAND/ΜL (ref 0.17–1.22)
MONOCYTES NFR BLD AUTO: 9 % (ref 4–12)
NEUTROPHILS # BLD AUTO: 6.72 THOUSANDS/ΜL (ref 1.85–7.62)
NEUTS SEG NFR BLD AUTO: 79 % (ref 43–75)
NITRITE UR QL STRIP: NEGATIVE
NRBC BLD AUTO-RTO: 0 /100 WBCS
NT-PROBNP SERPL-MCNC: 776 PG/ML
PH UR STRIP.AUTO: 7 [PH] (ref 5–9)
PHOSPHATE SERPL-MCNC: 2.8 MG/DL (ref 2.3–4.1)
PLATELET # BLD AUTO: 94 THOUSANDS/UL (ref 149–390)
PMV BLD AUTO: 11.4 FL (ref 8.9–12.7)
POTASSIUM SERPL-SCNC: 3.2 MMOL/L (ref 3.5–5.3)
PROT SERPL-MCNC: 5.2 G/DL (ref 6.4–8.2)
PROT UR STRIP-MCNC: NEGATIVE MG/DL
PROTHROMBIN TIME: 10.8 SECONDS (ref 9.4–11.7)
RBC # BLD AUTO: 4.54 MILLION/UL (ref 3.88–5.62)
SODIUM SERPL-SCNC: 139 MMOL/L (ref 136–145)
SP GR UR STRIP.AUTO: 1.01 (ref 1–1.03)
TROPONIN I SERPL-MCNC: 0.04 NG/ML
TROPONIN I SERPL-MCNC: 0.06 NG/ML
TSH SERPL DL<=0.05 MIU/L-ACNC: 2.13 UIU/ML (ref 0.36–3.74)
UROBILINOGEN UR QL STRIP.AUTO: 0.2 E.U./DL
WBC # BLD AUTO: 8.39 THOUSAND/UL (ref 4.31–10.16)

## 2018-05-31 PROCEDURE — 93005 ELECTROCARDIOGRAM TRACING: CPT

## 2018-05-31 PROCEDURE — 85610 PROTHROMBIN TIME: CPT | Performed by: EMERGENCY MEDICINE

## 2018-05-31 PROCEDURE — 85730 THROMBOPLASTIN TIME PARTIAL: CPT | Performed by: EMERGENCY MEDICINE

## 2018-05-31 PROCEDURE — 99285 EMERGENCY DEPT VISIT HI MDM: CPT

## 2018-05-31 PROCEDURE — 83880 ASSAY OF NATRIURETIC PEPTIDE: CPT | Performed by: EMERGENCY MEDICINE

## 2018-05-31 PROCEDURE — 84100 ASSAY OF PHOSPHORUS: CPT | Performed by: EMERGENCY MEDICINE

## 2018-05-31 PROCEDURE — 1111F DSCHRG MED/CURRENT MED MERGE: CPT | Performed by: FAMILY MEDICINE

## 2018-05-31 PROCEDURE — 93000 ELECTROCARDIOGRAM COMPLETE: CPT | Performed by: FAMILY MEDICINE

## 2018-05-31 PROCEDURE — 70450 CT HEAD/BRAIN W/O DYE: CPT

## 2018-05-31 PROCEDURE — 96361 HYDRATE IV INFUSION ADD-ON: CPT

## 2018-05-31 PROCEDURE — 71045 X-RAY EXAM CHEST 1 VIEW: CPT

## 2018-05-31 PROCEDURE — 84484 ASSAY OF TROPONIN QUANT: CPT | Performed by: EMERGENCY MEDICINE

## 2018-05-31 PROCEDURE — 87081 CULTURE SCREEN ONLY: CPT | Performed by: FAMILY MEDICINE

## 2018-05-31 PROCEDURE — 85025 COMPLETE CBC W/AUTO DIFF WBC: CPT | Performed by: EMERGENCY MEDICINE

## 2018-05-31 PROCEDURE — 80053 COMPREHEN METABOLIC PANEL: CPT | Performed by: EMERGENCY MEDICINE

## 2018-05-31 PROCEDURE — 82533 TOTAL CORTISOL: CPT | Performed by: EMERGENCY MEDICINE

## 2018-05-31 PROCEDURE — 83735 ASSAY OF MAGNESIUM: CPT | Performed by: EMERGENCY MEDICINE

## 2018-05-31 PROCEDURE — 84443 ASSAY THYROID STIM HORMONE: CPT | Performed by: EMERGENCY MEDICINE

## 2018-05-31 PROCEDURE — 96365 THER/PROPH/DIAG IV INF INIT: CPT

## 2018-05-31 PROCEDURE — 36415 COLL VENOUS BLD VENIPUNCTURE: CPT | Performed by: EMERGENCY MEDICINE

## 2018-05-31 PROCEDURE — 99214 OFFICE O/P EST MOD 30 MIN: CPT | Performed by: FAMILY MEDICINE

## 2018-05-31 PROCEDURE — 84484 ASSAY OF TROPONIN QUANT: CPT | Performed by: FAMILY MEDICINE

## 2018-05-31 PROCEDURE — 81003 URINALYSIS AUTO W/O SCOPE: CPT | Performed by: EMERGENCY MEDICINE

## 2018-05-31 PROCEDURE — 99220 PR INITIAL OBSERVATION CARE/DAY 70 MINUTES: CPT | Performed by: FAMILY MEDICINE

## 2018-05-31 RX ORDER — ASPIRIN 81 MG/1
81 TABLET, CHEWABLE ORAL DAILY
Status: DISCONTINUED | OUTPATIENT
Start: 2018-06-01 | End: 2018-06-01 | Stop reason: HOSPADM

## 2018-05-31 RX ORDER — POTASSIUM CHLORIDE 14.9 MG/ML
20 INJECTION INTRAVENOUS ONCE
Status: COMPLETED | OUTPATIENT
Start: 2018-05-31 | End: 2018-05-31

## 2018-05-31 RX ORDER — MAGNESIUM SULFATE HEPTAHYDRATE 40 MG/ML
2 INJECTION, SOLUTION INTRAVENOUS ONCE
Status: COMPLETED | OUTPATIENT
Start: 2018-05-31 | End: 2018-05-31

## 2018-05-31 RX ORDER — ATORVASTATIN CALCIUM 10 MG/1
10 TABLET, FILM COATED ORAL
Status: DISCONTINUED | OUTPATIENT
Start: 2018-06-01 | End: 2018-06-01 | Stop reason: HOSPADM

## 2018-05-31 RX ORDER — ONDANSETRON 2 MG/ML
4 INJECTION INTRAMUSCULAR; INTRAVENOUS EVERY 6 HOURS PRN
Status: DISCONTINUED | OUTPATIENT
Start: 2018-05-31 | End: 2018-06-01 | Stop reason: HOSPADM

## 2018-05-31 RX ORDER — LABETALOL HYDROCHLORIDE 5 MG/ML
10 INJECTION, SOLUTION INTRAVENOUS ONCE
Status: COMPLETED | OUTPATIENT
Start: 2018-05-31 | End: 2018-05-31

## 2018-05-31 RX ORDER — CALCIUM CARBONATE 200(500)MG
1000 TABLET,CHEWABLE ORAL DAILY PRN
Status: DISCONTINUED | OUTPATIENT
Start: 2018-05-31 | End: 2018-06-01 | Stop reason: HOSPADM

## 2018-05-31 RX ORDER — ASPIRIN 325 MG
325 TABLET ORAL ONCE
Status: COMPLETED | OUTPATIENT
Start: 2018-05-31 | End: 2018-05-31

## 2018-05-31 RX ORDER — LEVOTHYROXINE SODIUM 0.07 MG/1
75 TABLET ORAL
Status: DISCONTINUED | OUTPATIENT
Start: 2018-06-01 | End: 2018-06-01 | Stop reason: HOSPADM

## 2018-05-31 RX ORDER — POTASSIUM CHLORIDE 29.8 MG/ML
40 INJECTION INTRAVENOUS ONCE
Status: DISCONTINUED | OUTPATIENT
Start: 2018-05-31 | End: 2018-05-31 | Stop reason: CLARIF

## 2018-05-31 RX ORDER — ATORVASTATIN CALCIUM 10 MG/1
10 TABLET, FILM COATED ORAL
Status: DISCONTINUED | OUTPATIENT
Start: 2018-05-31 | End: 2018-05-31

## 2018-05-31 RX ORDER — PREDNISONE 20 MG/1
40 TABLET ORAL DAILY
Status: DISCONTINUED | OUTPATIENT
Start: 2018-06-01 | End: 2018-06-01 | Stop reason: HOSPADM

## 2018-05-31 RX ADMIN — POTASSIUM CHLORIDE 20 MEQ: 200 INJECTION, SOLUTION INTRAVENOUS at 16:48

## 2018-05-31 RX ADMIN — SODIUM CHLORIDE 1000 ML: 0.9 INJECTION, SOLUTION INTRAVENOUS at 14:44

## 2018-05-31 RX ADMIN — LABETALOL 20 MG/4 ML (5 MG/ML) INTRAVENOUS SYRINGE 10 MG: at 18:24

## 2018-05-31 RX ADMIN — MAGNESIUM SULFATE HEPTAHYDRATE 2 G: 40 INJECTION, SOLUTION INTRAVENOUS at 15:16

## 2018-05-31 RX ADMIN — ASPIRIN 325 MG: 325 TABLET ORAL at 18:54

## 2018-05-31 RX ADMIN — POTASSIUM CHLORIDE 20 MEQ: 200 INJECTION, SOLUTION INTRAVENOUS at 18:53

## 2018-05-31 NOTE — H&P
H&P Exam - Bentley Cohasset 81 y o  male MRN: [id number]    Unit/Bed#: [address] [place] [address] Encounter: [id number]    Assessment/Plan:   81year old male sent from [facility] with hypotension and complaints of worsening shortness of breath to be admitted for elevated troponin and intended stress test   Patient to be admitted for 24 hours observation under the service of Dr Gamal Cerna with anticipated disposition home  Generalized Fatigue, Dizziness and Shortness of breath:  Possibly secondary to tapering of steroid treatment of temporal arteritis versus cardiac etiology  Patient was on started on 80 mg prednisone and per patient has been tapering down about 10 mg every 3 weeks  Patient presently on 40 mg daily  ER ordered random cortisol resulted 42 9; feeling dizzy  - orthostatics normal in ED  - restart back on prednisone 40 mg  - call endocrine in [place] who advised suspicion of adrenal insufficiency would have more clinical correlates    - TSH 2 130  - CT head without contrast showed no intracranial process  - maintain will follow up with Rheumatologist out patient    Elevated Troponin: 0 06 POA; likely type 2 NSTEMI from episode of hypotension; EKG NSR with no active chest pain on admission   - denies active chest pain presently  - trend troponin x 2 more  - EKG showed normal sinus rhythm  - NPO at MN Lexiscan Stress test in AM  - Morphine & nitrite PRN  - 2L O2 PRN  - aspirin 81 mg qd    Episodes of Hypotension: reportedly recorded systolic BP in 58H-21Z with POA 80/50  Received 1L NS bolus in ED and blood pressure responded with BPs 100-200s/  - BP is hypertensive at 172/96 now  - will repeat orthostatic BP   - Labetalol or hydralazine PRN for MAP >160    History of Grade 1 diastolic dysfunction Elevated BNP: 776 POA  - patient appears euvolemic  - chest XR shows no acute cardiopulmonary disease  - echocardiogram tomorrow  - cardiology consult placed    Aortic Stenosis/ Mitral Regurtigation (last echocardiogram 2/5/2018)  - last echocardiogram EF 55% with Grade 1 DD, Moderate stenosis, FALGUNI 1 35, Mild Mitral Regurgitation  - echocardiogram ordered for tomorrow    History of Temporal Arteritis Dr Dee Nuñez, a rheumatologist who took over management of patient's temporal arteritis and has been tapering his prednisone down 10 mg every 3 weeks  - continue on prednisone 40 mg qd    HLD:   - ordered fasting lipid panel  - continue with lipitor 10 mg     Hypothyroidism:   - TSH 2 130 (5/31)  - continue with levothyroxine 75mcg qd    Hypokalemia: 3 2 POA; was given potassium 40 mEq IV  - will replete  - f/u BMP daily    H/o Throat Cancer and with tracheostomy  - Trach Care  - patient's family will be bringing patient's extra cannula    Global  DVT prophylaxis with lovenox  Heplock  Cardiac diet; NPO after MN for Lexiscan      History of Present Illness   81year old male with PMH of HTN, HLD, mild to moderate Aortic stenosis and Mitral regurgitation, h/o of throat cancer and Temporal Arteritis presents to the ED after being sent from [facility] with systolic BP around 44X and complaints of worsening shortness of breath  Over last 6 weeks patient has been noting worsening shortness of breath and OLGUIN  Patient has baseline physical activity of gardening and general maintenance household  Now patient reports OLGUIN going up 1 flight of stairs  Activities like gardening and house work lasting 5 minutes would require almost 10 minutes of rest in between  Patient also has reported nausea, fatigue, and insomnia  Of note patient does attribute shortness of breath to more thicken secretions and mucus causing blocking of trach collar cannula  Patient still has normal appetite, denies any chest pain  Of note patient around December started to note headaches and vision changes and went to see Dr Ayad Zhong, his ophthalmologist and blood work and biopsy consistent with temporal arteritis  Dr Guera Estrada started patient on prednisone 80 mg qd in February 2018  Patient has since been referred to Dr Eric Brennan, a rheumatologist who took over management of patient's temporal arteritis and has been tapering his prednisone down 10 mg every 3 weeks  Today (5/31/2018 is patient's first day to at prednisone dose of 40 mg qd  Patient's next appointment is in June 22 with rheumatologist       Of note patient's throat cancer in 2010 is s/p chemo and radiation and patient has tracheostomy which he maintains himself by cleaning his inner cannula's daily  Family will be bringing his materials for cleaning and extra cannulas  ED Course:  Normal saline 1L Bolus, Magnesium sulfate 2 g IV; CXR The lungs are clear   No pneumothorax or pleural effusion  CT head w/o contrast - No signs of acute infarction,  intracranial mass, mass effect or midline shift   No acute parenchymal hemorrhage    Intracranial vascular calcifications are seen  Review of Systems   Constitutional: Positive for fatigue  Negative for appetite change, chills and fever  HENT: Negative for rhinorrhea and sinus pain  Eyes: Negative for visual disturbance  Respiratory: Positive for shortness of breath  Cardiovascular: Negative for chest pain and palpitations  Gastrointestinal: Positive for nausea  Negative for diarrhea and vomiting  Musculoskeletal: Negative for arthralgias  Skin: Negative for rash  Neurological: Positive for dizziness and weakness  Psychiatric/Behavioral: Negative for agitation         Historical Information   Past Medical History:   Diagnosis Date    Cancer ([address] )     throat    Disease of thyroid gland     Heart murmur     Temporal arteritis (HCC)      Past Surgical History:   Procedure Laterality Date    APPENDECTOMY      TRACHEOSTOMY       Social History   History   Alcohol Use No     Comment: rare     History   Drug Use No     History   Smoking Status    Former Smoker    Packs/day: 1 00    Years: 45 00    Quit date: 2009   Smokeless Tobacco    Never Used     Family History: History reviewed  No pertinent family history  Meds/Allergies   PTA meds:   Prior to Admission Medications   Prescriptions Last Dose Informant Patient Reported? Taking? Multiple Vitamins-Minerals (ICAPS AREDS 2 PO) 2018 at Unknown time  Yes Yes   Sig: Apply 2 capsules to the mouth or throat 2 (two) times a day   atorvastatin (LIPITOR) 10 mg tablet 2018 at Unknown time  Yes Yes   Sig: Take 1 tablet by mouth daily   levothyroxine 75 mcg tablet 2018 at 0630  Yes Yes   Sig: Take 1 tablet by mouth daily   mupirocin (BACTROBAN) 2 % ointment Unknown at Unknown time  No No   Sig: Apply topically 3 (three) times a day   predniSONE 20 mg tablet 2018 at 1200  No Yes   Si tabs po od      Facility-Administered Medications: None     Allergies   Allergen Reactions    Cefdinir        Objective   First Vitals:   Blood Pressure: 124/90 (18 1302)  Pulse: 80 (18 1302)  Temperature: (!) 97 2 °F (36 2 °C) (18 1302)  Temp Source: Tympanic (18 1302)  Respirations: 20 (18 1302)  Height: 5' 7" (170 2 cm) (18 1800)  Weight - Scale: 72 6 kg (160 lb) (18 1302)  SpO2: 93 % (18 1302)    Current Vitals:   Blood Pressure: (!) 172/96 (18 1716)  Pulse: 78 (18 1800)  Temperature: 97 8 °F (36 6 °C) (18 1800)  Temp Source: Temporal (18 1800)  Respirations: 16 (18 1414)  Height: 5' 7" (170 2 cm) (18 1800)  Weight - Scale: 75 8 kg (167 lb 3 2 oz) (18 1800)  SpO2: 94 % (18 1800)    No intake or output data in the 24 hours ending 18 2132    Invasive Devices     Peripheral Intravenous Line            Peripheral IV 18 Right Antecubital less than 1 day                Physical Exam   Constitutional: He is oriented to person, place, and time  He appears well-developed and well-nourished     HENT:   Head: Normocephalic and atraumatic  Tracheostomy   Eyes: Pupils are equal, round, and reactive to light  No scleral icterus  Neck: Normal range of motion  Neck supple  Cardiovascular: Normal rate and regular rhythm  Murmur heard  Pulmonary/Chest: No respiratory distress  He has no wheezes  Abdominal: Soft  Bowel sounds are normal  There is no tenderness  Musculoskeletal: He exhibits no edema  Neurological: He is alert and oriented to person, place, and time  No cranial nerve deficit  Skin: Skin is warm and dry  Psychiatric: He has a normal mood and affect   His behavior is normal  Judgment and thought content normal        Lab Results:   Results for orders placed or performed during the hospital encounter of 05/31/18   CBC and differential   Result Value Ref Range    WBC 8 39 4 31 - 10 16 Thousand/uL    RBC 4 54 3 88 - 5 62 Million/uL    Hemoglobin 14 0 12 0 - 17 0 g/dL    Hematocrit 43 4 36 5 - 49 3 %    MCV 96 82 - 98 fL    MCH 30 8 26 8 - 34 3 pg    MCHC 32 3 31 4 - 37 4 g/dL    RDW 16 9 (H) 11 6 - 15 1 %    MPV 11 4 8 9 - 12 7 fL    Platelets 94 (L) 308 - 390 Thousands/uL    nRBC 0 /100 WBCs    Neutrophils Relative 79 (H) 43 - 75 %    Immat GRANS % 1 0 - 2 %    Lymphocytes Relative 10 (L) 14 - 44 %    Monocytes Relative 9 4 - 12 %    Eosinophils Relative 1 0 - 6 %    Basophils Relative 0 0 - 1 %    Neutrophils Absolute 6 72 1 85 - 7 62 Thousands/µL    Immature Grans Absolute 0 05 0 00 - 0 20 Thousand/uL    Lymphocytes Absolute 0 81 0 60 - 4 47 Thousands/µL    Monocytes Absolute 0 76 0 17 - 1 22 Thousand/µL    Eosinophils Absolute 0 05 0 00 - 0 61 Thousand/µL    Basophils Absolute 0 00 0 00 - 0 10 Thousands/µL   Protime-INR   Result Value Ref Range    Protime 10 8 9 4 - 11 7 seconds    INR 1 03 0 86 - 1 16   APTT   Result Value Ref Range    PTT 23 (L) 24 - 33 seconds   Comprehensive metabolic panel   Result Value Ref Range    Sodium 139 136 - 145 mmol/L    Potassium 3 2 (L) 3 5 - 5 3 mmol/L    Chloride 104 100 - 108 mmol/L    CO2 30 21 - 32 mmol/L    Anion Gap 5 4 - 13 mmol/L    BUN 26 (H) 5 - 25 mg/dL    Creatinine 0 94 0 60 - 1 30 mg/dL    Glucose 94 65 - 140 mg/dL    Calcium 7 9 (L) 8 3 - 10 1 mg/dL    AST 19 5 - 45 U/L    ALT 39 12 - 78 U/L    Alkaline Phosphatase 45 (L) 46 - 116 U/L    Total Protein 5 2 (L) 6 4 - 8 2 g/dL    Albumin 2 5 (L) 3 5 - 5 0 g/dL    Total Bilirubin 0 40 0 20 - 1 00 mg/dL    eGFR 75 ml/min/1 73sq m   TSH, 3rd generation with T4 reflex   Result Value Ref Range    TSH 3RD GENERATON 2 130 0 358 - 3 740 uIU/mL   Cortisol   Result Value Ref Range    Cortisol, Random 42 9 ug/dL   UA w Reflex to Microscopic w Reflex to Culture   Result Value Ref Range    Color, UA Yellow     Clarity, UA Clear     Specific Russell, UA 1 010 1 000 - 1 030    pH, UA 7 0 5 0 - 9 0    Leukocytes, UA Negative Negative    Nitrite, UA Negative Negative    Protein, UA Negative Negative mg/dl    Glucose, UA Negative Negative mg/dl    Ketones, UA Negative Negative mg/dl    Urobilinogen, UA 0 2 0 2, 1 0 E U /dl E U /dl    Bilirubin, UA Negative Negative    Blood, UA Negative Negative   Magnesium   Result Value Ref Range    Magnesium 1 8 1 6 - 2 6 mg/dL   Phosphorus   Result Value Ref Range    Phosphorus 2 8 2 3 - 4 1 mg/dL   B-type natriuretic peptide   Result Value Ref Range    NT-proBNP 776 (H) <450 pg/mL   Troponin I   Result Value Ref Range    Troponin I 0 06 (H) <=0 04 ng/mL   Troponin I   Result Value Ref Range    Troponin I 0 04 <=0 04 ng/mL       Imaging:   CT head without contrast   Final Result      No acute intracranial abnormality  Mild microangiopathic changes  Workstation performed: [id number]         XR chest 1 view portable   Final Result      No acute cardiopulmonary disease  Other nonacute findings as above              Workstation performed: [id number]             EKG, Pathology, and Other Studies:     Sinus rhythm, rate 80, normal axis, frequent PVC/bigeminy rhythm noted, no acute ST elevations noted, no previous EKG available for comparison  Code Status: Level 1 - Full Code  Advance Directive and Living Will:      Power of :    POLST:      Counseling / Coordination of Care: Total floor / unit time spent today 45 minutes

## 2018-05-31 NOTE — ED PROVIDER NOTES
History  Chief Complaint   Patient presents with    Fatigue     Patient c/o feeling fatgued for the past few weeks    Hypotension     was seen from PMD BP there was sysolic 80 and 90    Shortness of Breath     c/o feeling SOB since being on prednisone for temperal arteritis     56-year-old male with past medical history of temple arteritis, hypothyroidism, throat cancer status post trach placement, presents to the ER with complaint of generalized weakness and lightheadedness over the past 6 weeks  Patient describes the lightheadedness to be feeling off balance  Patient does not note any dizziness with room spinning  Patient has been on high-dose prednisone therapy for the temporal arteritis over the past few months  Patient is currently on a prednisone taper  His current daily doses 40 mg daily  Patient was seen at his PCPs office today and found to have systolic blood pressure in the 80s to 90s  Patient subsequently sent to the ER for further evaluation  Patient currently denies any chest pain, fevers, chills, nausea, vomiting, diarrhea, dysuria, shortness of breath  Patient's PCP was concern for possible adrenal insufficiency  History provided by:  Patient  Fatigue   Associated symptoms: shortness of breath    Associated symptoms: no abdominal pain, no arthralgias, no chest pain, no cough, no diarrhea, no dizziness, no dysuria, no fever, no headaches, no nausea, no urgency and no vomiting    Shortness of Breath   Associated symptoms: no abdominal pain, no chest pain, no cough, no fever, no headaches, no sore throat, no vomiting and no wheezing        Prior to Admission Medications   Prescriptions Last Dose Informant Patient Reported?  Taking?   atorvastatin (LIPITOR) 10 mg tablet 5/30/2018 at Unknown time  Yes Yes   Sig: Take 1 tablet by mouth daily   levothyroxine 75 mcg tablet 5/31/2018 at 0630  Yes Yes   Sig: Take 1 tablet by mouth daily   mupirocin (BACTROBAN) 2 % ointment Unknown at Unknown time  No No   Sig: Apply topically 3 (three) times a day   predniSONE 20 mg tablet 2018 at 1200  No Yes   Si tabs po od      Facility-Administered Medications: None       Past Medical History:   Diagnosis Date    Cancer ([facility] )     throat    Disease of thyroid gland     Heart murmur     Temporal arteritis ([facility] )        Past Surgical History:   Procedure Laterality Date    TRACHEOSTOMY         History reviewed  No pertinent family history  I have reviewed and agree with the history as documented  Social History   Substance Use Topics    Smoking status: Former Smoker     Quit date: 2009    Smokeless tobacco: Never Used    Alcohol use Yes      Comment: rare        Review of Systems   Constitutional: Positive for fatigue  Negative for activity change and fever  HENT: Negative for congestion, ear discharge and sore throat  Eyes: Negative for pain and redness  Respiratory: Positive for shortness of breath  Negative for cough, chest tightness and wheezing  Cardiovascular: Negative for chest pain  Gastrointestinal: Negative for abdominal pain, diarrhea, nausea and vomiting  Endocrine: Negative for cold intolerance  Genitourinary: Negative for dysuria and urgency  Musculoskeletal: Negative for arthralgias and back pain  Neurological: Positive for light-headedness  Negative for dizziness, weakness and headaches  Psychiatric/Behavioral: Negative for agitation and behavioral problems  Physical Exam  Physical Exam   Constitutional: He is oriented to person, place, and time  He appears well-developed and well-nourished  HENT:   Head: Normocephalic and atraumatic  Nose: Nose normal    Mouth/Throat: Oropharynx is clear and moist    Eyes: Conjunctivae and EOM are normal    Neck: Normal range of motion  Neck supple  Trach collar noted in place  Cardiovascular: Normal rate, regular rhythm and normal heart sounds      Pulmonary/Chest: Effort normal and breath sounds normal    Abdominal: Soft  Bowel sounds are normal  He exhibits no distension  There is no tenderness  Musculoskeletal: Normal range of motion  Neurological: He is alert and oriented to person, place, and time  Skin: Skin is warm  Psychiatric: He has a normal mood and affect  His behavior is normal  Judgment and thought content normal    Nursing note and vitals reviewed        Vital Signs  ED Triage Vitals [05/31/18 1302]   Temperature Pulse Respirations Blood Pressure SpO2   (!) 97 2 °F (36 2 °C) 80 20 124/90 93 %      Temp Source Heart Rate Source Patient Position - Orthostatic VS BP Location FiO2 (%)   Tympanic Monitor Lying Left arm --      Pain Score       --           Vitals:    05/31/18 1408 05/31/18 1409 05/31/18 1413 05/31/18 1414   BP: 155/97 (!) 202/104 (!) 191/114 (!) 188/118   Pulse: 67 80 82 81   Patient Position - Orthostatic VS: Lying - Orthostatic VS Sitting - Orthostatic VS Standing - Orthostatic VS Standing for 3 minutes - Orthostatic VS       Visual Acuity      ED Medications  Medications   potassium chloride 20 mEq IVPB (premix) (not administered)     Followed by   potassium chloride 20 mEq IVPB (premix) (not administered)   sodium chloride 0 9 % bolus 1,000 mL (1,000 mL Intravenous New Bag 5/31/18 1444)   magnesium sulfate 2 g/50 mL IVPB (premix) 2 g (2 g Intravenous New Bag 5/31/18 1516)       Diagnostic Studies  Results Reviewed     Procedure Component Value Units Date/Time    Cortisol [[id number]] Collected:  05/31/18 1347    Lab Status:  Final result Specimen:  Blood from Arm, Right Updated:  05/31/18 1621     Cortisol, Random 42 9 ug/dL     UA w Reflex to Microscopic w Reflex to Culture [[id number]] Collected:  05/31/18 1420    Lab Status:  Final result Specimen:  Urine from Urine, Clean Catch Updated:  05/31/18 1429     Color, UA Yellow     Clarity, UA Clear     Specific Gravity, UA 1 010     pH, UA 7 0     Leukocytes, UA Negative     Nitrite, UA Negative     Protein, UA Negative mg/dl Glucose, UA Negative mg/dl      Ketones, UA Negative mg/dl      Urobilinogen, UA 0 2 E U /dl      Bilirubin, UA Negative     Blood, UA Negative    TSH, 3rd generation with T4 reflex [[id number]]  (Normal) Collected:  05/31/18 1346    Lab Status:  Final result Specimen:  Blood from Arm, Left Updated:  05/31/18 1419     TSH 3RD GENERATON 2 130 uIU/mL     Narrative:         Patients undergoing fluorescein dye angiography may retain small amounts of fluorescein in the body for 48-72 hours post procedure  Samples containing fluorescein can produce falsely depressed TSH values  If the patient had this procedure,a specimen should be resubmitted post fluorescein clearance  Magnesium [[id number]]  (Normal) Collected:  05/31/18 1346    Lab Status:  Final result Specimen:  Blood from Arm, Left Updated:  05/31/18 1419     Magnesium 1 8 mg/dL     Phosphorus [[id number]]  (Normal) Collected:  05/31/18 1346    Lab Status:  Final result Specimen:  Blood from Arm, Left Updated:  05/31/18 1419     Phosphorus 2 8 mg/dL     B-type natriuretic peptide [[id number]]  (Abnormal) Collected:  05/31/18 1346    Lab Status:  Final result Specimen:  Blood from Arm, Left Updated:  05/31/18 1419     NT-proBNP 776 (H) pg/mL     Troponin I [[id number]]  (Abnormal) Collected:  05/31/18 1346    Lab Status:  Final result Specimen:  Blood from Arm, Left Updated:  05/31/18 1416     Troponin I 0 06 (H) ng/mL     Narrative:         [employer] Chemistry analyzer 99% cutoff is > 0 04 ng/mL in network labs    o cTnI 99% cutoff is useful only when applied to patients in the clinical setting of myocardial ischemia  o cTnI 99% cutoff should be interpreted in the context of clinical history, ECG findings and possibly cardiac imaging to establish correct diagnosis  o cTnI 99% cutoff may be suggestive but clearly not indicative of a coronary event without the clinical setting of myocardial ischemia      CBC and differential [[id number]]  (Abnormal) Collected:  05/31/18 1346    Lab Status:  Final result Specimen:  Blood from Arm, Left Updated:  05/31/18 1415     WBC 8 39 Thousand/uL      RBC 4 54 Million/uL      Hemoglobin 14 0 g/dL      Hematocrit 43 4 %      MCV 96 fL      MCH 30 8 pg      MCHC 32 3 g/dL      RDW 16 9 (H) %      MPV 11 4 fL      Platelets 94 (L) Thousands/uL      nRBC 0 /100 WBCs      Neutrophils Relative 79 (H) %      Immat GRANS % 1 %      Lymphocytes Relative 10 (L) %      Monocytes Relative 9 %      Eosinophils Relative 1 %      Basophils Relative 0 %      Neutrophils Absolute 6 72 Thousands/µL      Immature Grans Absolute 0 05 Thousand/uL      Lymphocytes Absolute 0 81 Thousands/µL      Monocytes Absolute 0 76 Thousand/µL      Eosinophils Absolute 0 05 Thousand/µL      Basophils Absolute 0 00 Thousands/µL     Protime-INR [[id number]]  (Normal) Collected:  05/31/18 1346    Lab Status:  Final result Specimen:  Blood from Arm, Left Updated:  05/31/18 1414     Protime 10 8 seconds      INR 1 03    APTT [[id number]]  (Abnormal) Collected:  05/31/18 1346    Lab Status:  Final result Specimen:  Blood from Arm, Left Updated:  05/31/18 1414     PTT 23 (L) seconds     Comprehensive metabolic panel [[id number]]  (Abnormal) Collected:  05/31/18 1346    Lab Status:  Final result Specimen:  Blood from Arm, Left Updated:  05/31/18 1411     Sodium 139 mmol/L      Potassium 3 2 (L) mmol/L      Chloride 104 mmol/L      CO2 30 mmol/L      Anion Gap 5 mmol/L      BUN 26 (H) mg/dL      Creatinine 0 94 mg/dL      Glucose 94 mg/dL      Calcium 7 9 (L) mg/dL      AST 19 U/L      ALT 39 U/L      Alkaline Phosphatase 45 (L) U/L      Total Protein 5 2 (L) g/dL      Albumin 2 5 (L) g/dL      Total Bilirubin 0 40 mg/dL      eGFR 75 ml/min/1 73sq m     Narrative:         National Kidney Disease Education Program recommendations are as follows:  GFR calculation is accurate only with a steady state creatinine  Chronic Kidney disease less than 60 ml/min/1 73 sq  meters  Kidney failure less than 15 ml/min/1 73 sq  meters  CT head without contrast   Final Result by Alondra Martin MD (05/31 0832)      No acute intracranial abnormality  Mild microangiopathic changes  Workstation performed: [id number]         XR chest 1 view portable   Final Result by Daniela Alcaraz DO (05/31 9989)      No acute cardiopulmonary disease  Other nonacute findings as above  Workstation performed: [id number]                    Procedures  ECG 12 Lead Documentation  Date/Time: 5/31/2018 4:04 PM  Performed by: Melly Dyer  Authorized by: Melly Dyer     Indications / Diagnosis:  Dizziness  ECG reviewed by me, the ED Provider: yes    Patient location:  ED  Previous ECG:     Previous ECG:  Unavailable  Interpretation:     Interpretation: abnormal    Comments:      Sinus rhythm, rate 80, normal axis, frequent PVC/bigeminy rhythm noted, no acute ST elevations noted, no previous EKG available for comparison  Phone Contacts  ED Phone Contact    ED Course                               MDM  Number of Diagnoses or Management Options  Elevated troponin: new and requires workup  Weakness: new and requires workup  Diagnosis management comments: Obtain blood work, orthostatic vital signs, EKG, chest x-ray, CT brain  Give IV fluids for hypotension, give magnesium for bigeminy rhythm on EKG       Amount and/or Complexity of Data Reviewed  Clinical lab tests: ordered and reviewed  Tests in the radiology section of CPT®: ordered and reviewed  Tests in the medicine section of CPT®: ordered and reviewed  Review and summarize past medical records: yes  Discuss the patient with other providers: yes  Independent visualization of images, tracings, or specimens: yes    Risk of Complications, Morbidity, and/or Mortality  General comments: Blood work shows elevated troponin mild low potassium level  Potassium level was repleted in the E R   Patient's bigeminy rhythm improved with magnesium given in the ER  At this point concern for adrenal insufficiency is low as patient's blood pressure responded very quickly to IV fluids  Patient is not hypoglycemic or hypothermic in the ER  Cortisol level is pending  At this time patient is admitted to [facility] for further management of his lightheadedness and elevated troponin  When initially trying to admit patient to, to service, [facility] attending on call head concern for possible development of adrenal crisis in the hospital   She initially had recommendation to transfer patient to [facility]  I discussed case with [facility] attending Samy Garber at [place] [id number], who also agrees that at this point concern for adrenal insufficiency and is very low  At this time patient can be observed and monitored at our [place] and if patient becomes symptomatic and there is concern for adrenal insufficiency versus adrenal crisis then at that time patient can be transferred to [facility] for endocrinology evaluation  This was discussed with [facility] attending on call who accepted patient for admission  At this point patient agrees with admission plans      Patient Progress  Patient progress: stable    CritCare Time    Disposition  Final diagnoses:   Weakness   Elevated troponin     Time reflects when diagnosis was documented in both MDM as applicable and the Disposition within this note     Time User Action Codes Description Comment    5/31/2018  3:39 PM Vero Stock Add [R53 1] Weakness     5/31/2018  3:39 PM Vero Stock Add [R74 8] Elevated troponin       ED Disposition     ED Disposition Condition Comment    Admit  Case was discussed with Dr Jennifer Arellano and the patient's admission status was agreed to be Admission Status: observation status to the service of Dr Jennifer Arellano         [facility]    None         Patient's Medications   Discharge Prescriptions    No medications on file     No discharge procedures on file      ED Provider  Electronically Signed by           Yvrose Tucker, DO  05/31/18 [address], DO  05/31/18 7440

## 2018-05-31 NOTE — PROGRESS NOTES
Assessment/Plan:    Will send to er for eval and manmt of bp  May need observation and card consult  instructions     Diagnoses and all orders for this visit:    Hypotension, unspecified hypotension type    Dizziness  -     POCT ECG    Aortic valve stenosis, etiology of cardiac valve disease unspecified    Temporal arteritis (HCC)          Subjective:      Patient ID: Elicia Decker is a 81 y o  male  Patient seen with sx of fatigue  Worsening over past 5 weeks  Had been seen by rheum  Had labs which were nl  And pred reduced  Had cxr  Which was nl  bp was nl at that time as was heart rate no bowel sx         The following portions of the patient's history were reviewed and updated as appropriate: past family history, past medical history, past social history and past surgical history  Review of Systems   Constitutional: Positive for activity change and fatigue  Negative for fever  HENT: Negative  Eyes: Negative  Respiratory: Negative  Cardiovascular:        See hpi   Gastrointestinal: Positive for abdominal distention  Musculoskeletal: Negative  Skin: Negative  Neurological: Positive for light-headedness  Psychiatric/Behavioral: Negative  Objective:      BP 90/62 (BP Location: Left arm, Patient Position: Sitting)   Pulse (!) 48   Temp (!) 96 8 °F (36 °C) (Tympanic)   Resp 20   Ht 5' 8" (1 727 m)   Wt 72 6 kg (160 lb)   SpO2 95%   BMI 24 33 kg/m²          Physical Exam   Constitutional: He is oriented to person, place, and time  He appears well-developed  He appears distressed  HENT:   Head: Normocephalic  Right Ear: External ear normal    Eyes: Pupils are equal, round, and reactive to light  Neck: Normal range of motion  Trach in place   Cardiovascular:   Lovina Caper with ectopic beats  Systolic murmur 4/6   Pulmonary/Chest: Effort normal and breath sounds normal  No respiratory distress  Abdominal: Soft  Bowel sounds are normal  He exhibits distension  Musculoskeletal: Normal range of motion  Neurological: He is alert and oriented to person, place, and time  Skin: Skin is warm  Psychiatric: He has a normal mood and affect   His behavior is normal

## 2018-06-01 ENCOUNTER — APPOINTMENT (OUTPATIENT)
Dept: NON INVASIVE DIAGNOSTICS | Facility: HOSPITAL | Age: 83
End: 2018-06-01
Payer: COMMERCIAL

## 2018-06-01 ENCOUNTER — APPOINTMENT (OUTPATIENT)
Dept: RADIOLOGY | Facility: HOSPITAL | Age: 83
End: 2018-06-01
Payer: COMMERCIAL

## 2018-06-01 VITALS
HEIGHT: 67 IN | RESPIRATION RATE: 18 BRPM | HEART RATE: 79 BPM | WEIGHT: 161.6 LBS | DIASTOLIC BLOOD PRESSURE: 88 MMHG | BODY MASS INDEX: 25.36 KG/M2 | TEMPERATURE: 97.7 F | SYSTOLIC BLOOD PRESSURE: 133 MMHG | OXYGEN SATURATION: 92 %

## 2018-06-01 PROBLEM — J43.2 CENTRILOBULAR EMPHYSEMA (HCC): Status: ACTIVE | Noted: 2018-06-01

## 2018-06-01 LAB
ANION GAP SERPL CALCULATED.3IONS-SCNC: 4 MMOL/L (ref 4–13)
ATRIAL RATE: 64 BPM
BACTERIA SPT RESP CULT: NORMAL
BUN SERPL-MCNC: 20 MG/DL (ref 5–25)
CALCIUM SERPL-MCNC: 8 MG/DL (ref 8.3–10.1)
CHLORIDE SERPL-SCNC: 105 MMOL/L (ref 100–108)
CHOLEST SERPL-MCNC: 185 MG/DL (ref 50–200)
CO2 SERPL-SCNC: 29 MMOL/L (ref 21–32)
CREAT SERPL-MCNC: 0.86 MG/DL (ref 0.6–1.3)
ERYTHROCYTE [DISTWIDTH] IN BLOOD BY AUTOMATED COUNT: 17.1 % (ref 11.6–15.1)
GFR SERPL CREATININE-BSD FRML MDRD: 81 ML/MIN/1.73SQ M
GLUCOSE SERPL-MCNC: 90 MG/DL (ref 65–140)
GRAM STN SPEC: NORMAL
HCT VFR BLD AUTO: 43.4 % (ref 36.5–49.3)
HDLC SERPL-MCNC: 78 MG/DL (ref 40–60)
HGB BLD-MCNC: 13.8 G/DL (ref 12–17)
LDLC SERPL CALC-MCNC: 91 MG/DL (ref 0–100)
MAGNESIUM SERPL-MCNC: 2.2 MG/DL (ref 1.6–2.6)
MCH RBC QN AUTO: 30.1 PG (ref 26.8–34.3)
MCHC RBC AUTO-ENTMCNC: 31.8 G/DL (ref 31.4–37.4)
MCV RBC AUTO: 95 FL (ref 82–98)
NONHDLC SERPL-MCNC: 107 MG/DL
P AXIS: 49 DEGREES
PHOSPHATE SERPL-MCNC: 3.1 MG/DL (ref 2.3–4.1)
PLATELET # BLD AUTO: 91 THOUSANDS/UL (ref 149–390)
PMV BLD AUTO: 10.5 FL (ref 8.9–12.7)
POTASSIUM SERPL-SCNC: 4 MMOL/L (ref 3.5–5.3)
PR INTERVAL: 140 MS
QRS AXIS: 9 DEGREES
QRSD INTERVAL: 84 MS
QT INTERVAL: 410 MS
QTC INTERVAL: 422 MS
RBC # BLD AUTO: 4.58 MILLION/UL (ref 3.88–5.62)
SODIUM SERPL-SCNC: 138 MMOL/L (ref 136–145)
T WAVE AXIS: 74 DEGREES
TRIGL SERPL-MCNC: 81 MG/DL
TROPONIN I SERPL-MCNC: 0.04 NG/ML
VENTRICULAR RATE: 64 BPM
WBC # BLD AUTO: 6.88 THOUSAND/UL (ref 4.31–10.16)

## 2018-06-01 PROCEDURE — 93010 ELECTROCARDIOGRAM REPORT: CPT | Performed by: INTERNAL MEDICINE

## 2018-06-01 PROCEDURE — 99203 OFFICE O/P NEW LOW 30 MIN: CPT | Performed by: INTERNAL MEDICINE

## 2018-06-01 PROCEDURE — 80061 LIPID PANEL: CPT | Performed by: STUDENT IN AN ORGANIZED HEALTH CARE EDUCATION/TRAINING PROGRAM

## 2018-06-01 PROCEDURE — 71250 CT THORAX DX C-: CPT

## 2018-06-01 PROCEDURE — 84484 ASSAY OF TROPONIN QUANT: CPT | Performed by: FAMILY MEDICINE

## 2018-06-01 PROCEDURE — 84100 ASSAY OF PHOSPHORUS: CPT | Performed by: FAMILY MEDICINE

## 2018-06-01 PROCEDURE — 93306 TTE W/DOPPLER COMPLETE: CPT

## 2018-06-01 PROCEDURE — 93306 TTE W/DOPPLER COMPLETE: CPT | Performed by: INTERNAL MEDICINE

## 2018-06-01 PROCEDURE — 85027 COMPLETE CBC AUTOMATED: CPT | Performed by: FAMILY MEDICINE

## 2018-06-01 PROCEDURE — 78452 HT MUSCLE IMAGE SPECT MULT: CPT

## 2018-06-01 PROCEDURE — 93005 ELECTROCARDIOGRAM TRACING: CPT

## 2018-06-01 PROCEDURE — 87205 SMEAR GRAM STAIN: CPT | Performed by: INTERNAL MEDICINE

## 2018-06-01 PROCEDURE — 94760 N-INVAS EAR/PLS OXIMETRY 1: CPT

## 2018-06-01 PROCEDURE — 83735 ASSAY OF MAGNESIUM: CPT | Performed by: FAMILY MEDICINE

## 2018-06-01 PROCEDURE — 80048 BASIC METABOLIC PNL TOTAL CA: CPT | Performed by: FAMILY MEDICINE

## 2018-06-01 PROCEDURE — 99217 PR OBSERVATION CARE DISCHARGE MANAGEMENT: CPT | Performed by: FAMILY MEDICINE

## 2018-06-01 PROCEDURE — 93017 CV STRESS TEST TRACING ONLY: CPT

## 2018-06-01 PROCEDURE — 94640 AIRWAY INHALATION TREATMENT: CPT

## 2018-06-01 PROCEDURE — A9502 TC99M TETROFOSMIN: HCPCS

## 2018-06-01 RX ORDER — PANTOPRAZOLE SODIUM 40 MG/1
40 TABLET, DELAYED RELEASE ORAL
Status: DISCONTINUED | OUTPATIENT
Start: 2018-06-02 | End: 2018-06-01 | Stop reason: HOSPADM

## 2018-06-01 RX ORDER — PANTOPRAZOLE SODIUM 40 MG/1
40 TABLET, DELAYED RELEASE ORAL
Qty: 30 TABLET | Refills: 0 | Status: SHIPPED | OUTPATIENT
Start: 2018-06-02 | End: 2018-06-28 | Stop reason: SDUPTHER

## 2018-06-01 RX ORDER — ASPIRIN 81 MG/1
81 TABLET, CHEWABLE ORAL DAILY
Qty: 30 TABLET | Refills: 3 | Status: SHIPPED | OUTPATIENT
Start: 2018-06-02 | End: 2018-08-30

## 2018-06-01 RX ORDER — IPRATROPIUM BROMIDE AND ALBUTEROL SULFATE 2.5; .5 MG/3ML; MG/3ML
3 SOLUTION RESPIRATORY (INHALATION)
Status: DISCONTINUED | OUTPATIENT
Start: 2018-06-01 | End: 2018-06-01 | Stop reason: HOSPADM

## 2018-06-01 RX ORDER — BUDESONIDE 0.5 MG/2ML
0.5 INHALANT ORAL
Status: DISCONTINUED | OUTPATIENT
Start: 2018-06-01 | End: 2018-06-01 | Stop reason: HOSPADM

## 2018-06-01 RX ORDER — BUDESONIDE 0.5 MG/2ML
0.5 INHALANT ORAL
Qty: 60 ML | Refills: 0 | Status: SHIPPED | OUTPATIENT
Start: 2018-06-01 | End: 2018-06-11 | Stop reason: SDUPTHER

## 2018-06-01 RX ADMIN — PREDNISONE 40 MG: 20 TABLET ORAL at 13:16

## 2018-06-01 RX ADMIN — IPRATROPIUM BROMIDE AND ALBUTEROL SULFATE 3 ML: .5; 3 SOLUTION RESPIRATORY (INHALATION) at 14:56

## 2018-06-01 RX ADMIN — ASPIRIN 81 MG 81 MG: 81 TABLET ORAL at 13:15

## 2018-06-01 RX ADMIN — LEVOTHYROXINE SODIUM 75 MCG: 75 TABLET ORAL at 06:33

## 2018-06-01 RX ADMIN — REGADENOSON 0.4 MG: 0.08 INJECTION, SOLUTION INTRAVENOUS at 12:27

## 2018-06-01 RX ADMIN — ENOXAPARIN SODIUM 40 MG: 40 INJECTION SUBCUTANEOUS at 09:23

## 2018-06-01 NOTE — PROGRESS NOTES
[address] [facility] 66 [address] 83 [facility] Practice Progress Note - Gaeb Freed 81 y o  male MRN: [id number]    Unit/Bed#: [address] [place] [address] [id number] Encounter: [id number]      Assessment/Plan:  20-year-old male with history of hypertension, hyperlipidemia, history throat cancer and temporal arthritis presents with worsening shortness of breath and hypertensive episodes  Generalized fatigue, dizziness, and shortness of breath  Possibly secondary to steroid taper of the temporal arthritis versus cardiac etiology  Patient was previously on 80 mg of prednisone and has been tapering down 10 mg every 3 weeks  Patient is currently on 40 mg of prednisone  ER random cortisol level was 42 9  Orthostatic vitals are normal in the ED  Patient is currently on prednisone 40 mg   TSH is 2 13  CT head shows no intracranial process  Patient should follow up outpatient with Rheumatology    Elevated troponin  On admission troponin was 0 06 with repeat of 0 04 and 0 04  EKG showed normal sinus rhythm  Patient is supposed to get Lexiscan stress test in the a m  Morphine and nitrates p r n  for pain  Nasal cannula 2 L for oxygen p r n  Cardiology consult  Continue Aspirin 81 mg    History of grade 1 diastolic function with elevated BNP  Chest x-ray shows no acute cardiopulmonary disease  Echo to be done this morning  Cardiology consulted    Aortic stenosis/mitral regurgitation from last echocardiogram on 2018  Last echocardiogram EF 55% with grade 1 diastolic  Will have echo today    History of temporal arthritis  Patient sees a rheumatologist name Dr Giovanni Araiza  Prednisone has been tapered down every 3 weeks by 10 mg she is currently on 40 mg      Hyperlipidemia  Continue with Lipitor 10 mg  Chol: 185, TA, LDL: 107    Hypothyroidism  TSH 2 13  It continue with levothyroxine 75 mcg qd    Hypokalemia  Resolved     History of throat cancer status post tracheostomy  Trach care    FEN  Dvt ppx with lovenox   Heplock   NPO for lexiscan Subjective:   Patient seen and examined at bedside  No overnight events  This morning he stated that he was hungry but is currently NPO due to stress test   He denies any chest pain, shortness of breath, or abdominal pain at this time  Objective:     Vitals: Blood pressure 155/96, pulse 62, temperature 97 6 °F (36 4 °C), temperature source Tympanic, resp  rate 16, height 5' 7" (1 702 m), weight 73 3 kg (161 lb 9 6 oz), SpO2 98 %  ,Body mass index is 25 31 kg/m²  Wt Readings from Last 3 Encounters:   06/01/18 73 3 kg (161 lb 9 6 oz)   05/31/18 72 6 kg (160 lb)   02/26/18 72 1 kg (159 lb)     No intake or output data in the 24 hours ending 06/01/18 0911    Physical Exam:  General: Pt is AAO x 3, not in any acute distress  Neck: trach in place  Cardio: RRR, S1 and S2 +, + murmur  Resp: CTA b/l, no wheezes/rales/rhonchi  Abdomen: soft, NT/ND, BS+  Extremities: no cyanosis or edema  PP 2+ b/l       Recent Results (from the past 24 hour(s))   CBC and differential    Collection Time: 05/31/18  1:46 PM   Result Value Ref Range    WBC 8 39 4 31 - 10 16 Thousand/uL    RBC 4 54 3 88 - 5 62 Million/uL    Hemoglobin 14 0 12 0 - 17 0 g/dL    Hematocrit 43 4 36 5 - 49 3 %    MCV 96 82 - 98 fL    MCH 30 8 26 8 - 34 3 pg    MCHC 32 3 31 4 - 37 4 g/dL    RDW 16 9 (H) 11 6 - 15 1 %    MPV 11 4 8 9 - 12 7 fL    Platelets 94 (L) 616 - 390 Thousands/uL    nRBC 0 /100 WBCs    Neutrophils Relative 79 (H) 43 - 75 %    Immat GRANS % 1 0 - 2 %    Lymphocytes Relative 10 (L) 14 - 44 %    Monocytes Relative 9 4 - 12 %    Eosinophils Relative 1 0 - 6 %    Basophils Relative 0 0 - 1 %    Neutrophils Absolute 6 72 1 85 - 7 62 Thousands/µL    Immature Grans Absolute 0 05 0 00 - 0 20 Thousand/uL    Lymphocytes Absolute 0 81 0 60 - 4 47 Thousands/µL    Monocytes Absolute 0 76 0 17 - 1 22 Thousand/µL    Eosinophils Absolute 0 05 0 00 - 0 61 Thousand/µL    Basophils Absolute 0 00 0 00 - 0 10 Thousands/µL   Protime-INR    Collection Time: 05/31/18  1:46 PM   Result Value Ref Range    Protime 10 8 9 4 - 11 7 seconds    INR 1 03 0 86 - 1 16   APTT    Collection Time: 05/31/18  1:46 PM   Result Value Ref Range    PTT 23 (L) 24 - 33 seconds   Comprehensive metabolic panel    Collection Time: 05/31/18  1:46 PM   Result Value Ref Range    Sodium 139 136 - 145 mmol/L    Potassium 3 2 (L) 3 5 - 5 3 mmol/L    Chloride 104 100 - 108 mmol/L    CO2 30 21 - 32 mmol/L    Anion Gap 5 4 - 13 mmol/L    BUN 26 (H) 5 - 25 mg/dL    Creatinine 0 94 0 60 - 1 30 mg/dL    Glucose 94 65 - 140 mg/dL    Calcium 7 9 (L) 8 3 - 10 1 mg/dL    AST 19 5 - 45 U/L    ALT 39 12 - 78 U/L    Alkaline Phosphatase 45 (L) 46 - 116 U/L    Total Protein 5 2 (L) 6 4 - 8 2 g/dL    Albumin 2 5 (L) 3 5 - 5 0 g/dL    Total Bilirubin 0 40 0 20 - 1 00 mg/dL    eGFR 75 ml/min/1 73sq m   TSH, 3rd generation with T4 reflex    Collection Time: 05/31/18  1:46 PM   Result Value Ref Range    TSH 3RD GENERATON 2 130 0 358 - 3 740 uIU/mL   Magnesium    Collection Time: 05/31/18  1:46 PM   Result Value Ref Range    Magnesium 1 8 1 6 - 2 6 mg/dL   Phosphorus    Collection Time: 05/31/18  1:46 PM   Result Value Ref Range    Phosphorus 2 8 2 3 - 4 1 mg/dL   B-type natriuretic peptide    Collection Time: 05/31/18  1:46 PM   Result Value Ref Range    NT-proBNP 776 (H) <450 pg/mL   Troponin I    Collection Time: 05/31/18  1:46 PM   Result Value Ref Range    Troponin I 0 06 (H) <=0 04 ng/mL   Cortisol    Collection Time: 05/31/18  1:47 PM   Result Value Ref Range    Cortisol, Random 42 9 ug/dL   UA w Reflex to Microscopic w Reflex to Culture    Collection Time: 05/31/18  2:20 PM   Result Value Ref Range    Color, UA Yellow     Clarity, UA Clear     Specific [place], UA 1 010 1 000 - 1 030    pH, UA 7 0 5 0 - 9 0    Leukocytes, UA Negative Negative    Nitrite, UA Negative Negative    Protein, UA Negative Negative mg/dl    Glucose, UA Negative Negative mg/dl    Ketones, UA Negative Negative mg/dl    Urobilinogen, UA 0 2 0 2, 1 0 E U /dl E U /dl    Bilirubin, UA Negative Negative    Blood, UA Negative Negative   Troponin I    Collection Time: 05/31/18  8:29 PM   Result Value Ref Range    Troponin I 0 04 <=0 04 ng/mL   Troponin I    Collection Time: 06/01/18  2:00 AM   Result Value Ref Range    Troponin I 0 04 <=0 04 ng/mL   ECG 12 lead    Collection Time: 06/01/18  4:58 AM   Result Value Ref Range    Ventricular Rate 64 BPM    Atrial Rate 64 BPM    NV Interval 140 ms    QRSD Interval 84 ms    QT Interval 410 ms    QTC Interval 422 ms    P Farnam 49 degrees    QRS Axis 9 degrees    T Wave Axis 74 degrees   Basic metabolic panel    Collection Time: 06/01/18  5:16 AM   Result Value Ref Range    Sodium 138 136 - 145 mmol/L    Potassium 4 0 3 5 - 5 3 mmol/L    Chloride 105 100 - 108 mmol/L    CO2 29 21 - 32 mmol/L    Anion Gap 4 4 - 13 mmol/L    BUN 20 5 - 25 mg/dL    Creatinine 0 86 0 60 - 1 30 mg/dL    Glucose 90 65 - 140 mg/dL    Calcium 8 0 (L) 8 3 - 10 1 mg/dL    eGFR 81 ml/min/1 73sq m   Magnesium    Collection Time: 06/01/18  5:16 AM   Result Value Ref Range    Magnesium 2 2 1 6 - 2 6 mg/dL   Phosphorus    Collection Time: 06/01/18  5:16 AM   Result Value Ref Range    Phosphorus 3 1 2 3 - 4 1 mg/dL   CBC (With Platelets)    Collection Time: 06/01/18  5:16 AM   Result Value Ref Range    WBC 6 88 4 31 - 10 16 Thousand/uL    RBC 4 58 3 88 - 5 62 Million/uL    Hemoglobin 13 8 12 0 - 17 0 g/dL    Hematocrit 43 4 36 5 - 49 3 %    MCV 95 82 - 98 fL    MCH 30 1 26 8 - 34 3 pg    MCHC 31 8 31 4 - 37 4 g/dL    RDW 17 1 (H) 11 6 - 15 1 %    Platelets 91 (L) 561 - 390 Thousands/uL    MPV 10 5 8 9 - 12 7 fL   Fasting Lipid panel    Collection Time: 06/01/18  5:16 AM   Result Value Ref Range    Cholesterol 185 50 - 200 mg/dL    Triglycerides 81 <=150 mg/dL    HDL, Direct 78 (H) 40 - 60 mg/dL    LDL Calculated 91 0 - 100 mg/dL    Non-HDL-Chol (CHOL-HDL) 107 mg/dl       Current Facility-Administered Medications   Medication Dose Route Frequency Provider Last Rate Last Dose    aspirin chewable tablet 81 mg  81 mg Oral Daily Maudry Shelton        atorvastatin (LIPITOR) tablet 10 mg  10 mg Oral Daily With Dinner Maudry Shelton        calcium carbonate (TUMS) chewable tablet 1,000 mg  1,000 mg Oral Daily PRN Maudry Shelton        enoxaparin (LOVENOX) subcutaneous injection 40 mg  40 mg Subcutaneous Daily Maudry Shelton        levothyroxine tablet 75 mcg  75 mcg Oral Early Morning Melba   75 mcg at 06/01/18 3696    ondansetron (ZOFRAN) injection 4 mg  4 mg Intravenous Q6H PRN Maudry Shelton        predniSONE tablet 40 mg  40 mg Oral Daily Maudry Shelton           Invasive Devices     Peripheral Intravenous Line            Peripheral IV 05/31/18 Right Antecubital less than 1 day                Lab, Imaging and other studies: I have personally reviewed pertinent reports      VTE Pharmacologic Prophylaxis: Enoxaparin (Lovenox)  VTE Mechanical Prophylaxis: sequential compression device    Irene Mota MD

## 2018-06-01 NOTE — CASE MANAGEMENT
Initial Clinical Review    Admission: Date/Time/Statement: 5/31/18 1540    Orders Placed This Encounter   Procedures    Place in Observation (expected length of stay for this patient is less than two midnights)     Standing Status:   Standing     Number of Occurrences:   1     Order Specific Question:   Admitting Physician     Answer:   Janis Pederson [[id number]]     Order Specific Question:   Level of Care     Answer:   Med Surg [[id number]]         ED: Date/Time/Mode of Arrival:   ED Arrival Information     Expected Arrival Acuity Means of Arrival Escorted By Service Admission Type    - 5/31/2018 12:40 Urgent Walk-In Self General Medicine Urgent    Arrival Complaint    low bp,weakness,          Chief Complaint:   Chief Complaint   Patient presents with    Fatigue     Patient c/o feeling fatgued for the past few weeks    Hypotension     was seen from PMD BP there was sysolic 80 and 80    Shortness of Breath     c/o feeling SOB since being on prednisone for temperal arteritis       History of Illness: 81year old male with PMH of HTN, HLD, mild to moderate Aortic stenosis and Mitral regurgitation, h/o of throat cancer and Temporal Arteritis presents to the ED after being sent from [place] [id number] with systolic BP around 18V and complaints of worsening shortness of breath      Over last 6 weeks patient has been noting worsening shortness of breath and OLGUIN  Patient has baseline physical activity of gardening and general maintenance household  Now patient reports OLGUIN going up 1 flight of stairs  Activities like gardening and house work lasting 5 minutes would require almost 10 minutes of rest in between  Patient also has reported nausea, fatigue, and insomnia  Of note patient does attribute shortness of breath to more thicken secretions and mucus causing blocking of trach collar cannula      Patient still has normal appetite, denies any chest pain      Of note patient around December started to note headaches and vision changes and went to see Dr Hallie Hui, his ophthalmologist and blood work and biopsy consistent with temporal arteritis  Dr Hallie Hui started patient on prednisone 80 mg qd in February 2018        Patient has since been referred to Dr Uyen Giles, a rheumatologist who took over management of patient's temporal arteritis and has been tapering his prednisone down 10 mg every 3 weeks  Today (5/31/2018 is patient's first day to at prednisone dose of 40 mg qd  Patient's next appointment is in June 22 with rheumatologist        Of note patient's throat cancer in 2010 is s/p chemo and radiation and patient has tracheostomy which he maintains himself by cleaning his inner cannula's daily  Family will be bringing his materials for cleaning and extra cannulas  ED Vital Signs:   ED Triage Vitals   Temperature Pulse Respirations Blood Pressure SpO2   05/31/18 1302 05/31/18 1302 05/31/18 1302 05/31/18 1302 05/31/18 1302   (!) 97 2 °F (36 2 °C) 80 20 124/90 93 %      Temp Source Heart Rate Source Patient Position - Orthostatic VS BP Location FiO2 (%)   05/31/18 1302 05/31/18 1302 05/31/18 1302 05/31/18 1302 --   Tympanic Monitor Lying Left arm       Pain Score       05/31/18 1700       No Pain        Wt Readings from Last 1 Encounters:   06/01/18 73 3 kg (161 lb 9 6 oz)       Vital Signs (abnormal): Abnormal Labs/Diagnostic Test Results: TROPONIN 0 06,0 04,0 04    CXR No acute cardiopulmonary disease  Other nonacute findings as above  CT HEAD  No acute intracranial abnormality    Mild microangiopathic changes     ED Treatment:   Medication Administration from 05/31/2018 1240 to 05/31/2018 1636       Date/Time Order Dose Route Action Action by Comments     05/31/2018 1444 sodium chloride 0 9 % bolus 1,000 mL 1,000 mL Intravenous [address] Zane Huertas RN      05/31/2018 1516 magnesium sulfate 2 g/50 mL IVPB (premix) 2 g 2 g Intravenous [place] Sameera Abdul RN           Past Medical/Surgical History: Active Ambulatory Problems     Diagnosis Date Noted    Aortic stenosis 11/17/2017    Benign essential hypertension 12/13/2013    Neck pain 01/12/2018    Elevated prostate specific antigen (PSA) 12/18/2013    Hyperlipidemia 12/13/2013    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    Low back pain 12/18/2013    Mitral regurgitation 11/17/2017    Plantar fasciitis 08/26/2015    Primary hypothyroidism 12/13/2013    PVCs (premature ventricular contractions) 08/26/2015    Right knee pain 17/08/6197    Systolic murmur 91/98/5084    Throat pain in adult 09/28/2015    Temporal arteritis ([facility] [address] ) 02/26/2018     Resolved Ambulatory Problems     Diagnosis Date Noted    No Resolved Ambulatory Problems     Past Medical History:   Diagnosis Date    [facility]     Disease of thyroid gland     Heart murmur     Temporal arteritis (HCC)        Admitting Diagnosis: Shortness of breath [R06 02]  Fatigue [R53 83]  Weakness [R53 1]  Hypertension [I10]  Elevated troponin [R74 8]    Age/Sex: 81 y o  male    Assessment/Plan:   81year old male sent from [place] [id number] with hypotension and complaints of worsening shortness of breath to be admitted for elevated troponin and intended stress test   Patient to be admitted for 24 hours observation under the service of Dr Merlin Flores with anticipated disposition home      Generalized Fatigue, Dizziness and Shortness of breath:  Possibly secondary to tapering of steroid treatment of temporal arteritis versus cardiac etiology  Patient was on started on 80 mg prednisone and per patient has been tapering down about 10 mg every 3 weeks  Patient presently on 40 mg daily  ER ordered random cortisol resulted 42 9; feeling dizzy  - orthostatics normal in ED  - restart back on prednisone 40 mg  - call endocrine in [place] who advised suspicion of adrenal insufficiency would have more clinical correlates    - TSH 2 130  - CT head without contrast showed no intracranial process  - maintain will follow up with Rheumatologist out patient     Elevated Troponin: 0 06 POA; likely type 2 NSTEMI from episode of hypotension; EKG NSR with no active chest pain on admission   - denies active chest pain presently  - trend troponin x 2 more  - EKG showed normal sinus rhythm  - NPO at MN Lexiscan Stress test in AM  - Morphine & nitrite PRN  - 2L O2 PRN  - aspirin 81 mg qd     Episodes of Hypotension: reportedly recorded systolic BP in 29Y-16E with POA 80/50  Received 1L NS bolus in ED and blood pressure responded with BPs 100-200s/  - BP is hypertensive at 172/96 now  - will repeat orthostatic BP   - Labetalol or hydralazine PRN for MAP >160     History of Grade 1 diastolic dysfunction Elevated BNP: 776 POA  - patient appears euvolemic  - chest XR shows no acute cardiopulmonary disease  - echocardiogram tomorrow  - cardiology consult placed     Aortic Stenosis/ Mitral Regurtigation (last echocardiogram 2/5/2018)  - last echocardiogram EF 55% with Grade 1 DD, Moderate stenosis, FALGUNI 1 35, Mild Mitral Regurgitation  - echocardiogram ordered for tomorrow     History of Temporal Arteritis Dr Casie Villegas, a rheumatologist who took over management of patient's temporal arteritis and has been tapering his prednisone down 10 mg every 3 weeks      - continue on prednisone 40 mg qd     HLD:   - ordered fasting lipid panel  - continue with lipitor 10 mg      Hypothyroidism:   - TSH 2 130 (5/31)  - continue with levothyroxine 75mcg qd     Hypokalemia: 3 2 POA; was given potassium 40 mEq IV  - will replete  - f/u BMP daily     H/o Throat Cancer and with tracheostomy  - [facility]  - patient's family will be bringing patient's extra cannula     Global  DVT prophylaxis with lovenox  Heplock  Cardiac diet; NPO after MN for Lexiscan        Admission Orders:  OBSERVATION  NPO  TELE [facility]  CONSULT CARDIOLOGY  CONSULT PULMONOLOGY  STRESS ECHO  CT CHEST   ORTHOSTATIC VS   DAILY WEIGHT I/O  Scheduled Meds:   Current Facility-Administered Medications:  aspirin 81 mg Oral Daily Naomi Saurav   atorvastatin 10 mg Oral Daily With [employer]   calcium carbonate 1,000 mg Oral Daily PRN Naomi Saurav   enoxaparin 40 mg Subcutaneous Daily Naomi Saurav   levothyroxine 75 mcg Oral Early Morning Nick Briceño   ondansetron 4 mg Intravenous Q6H PRN Naomi Saurav   predniSONE 40 mg Oral Daily Nick Briceño     Continuous Infusions:    PRN Meds: calcium carbonate    ondansetron

## 2018-06-01 NOTE — RESPIRATORY THERAPY NOTE
Patient awake and alert no distress noted  Nebulizer treatments ordered and given to patient  Found patient with shiley 6 cuffless tracheostomy  Patient has BVM @ bedside and spare emergency tracheostomy at bedside  Aggie Pierre states that he does all the trach care himself  Jerone Like, RRT

## 2018-06-01 NOTE — CONSULTS
Consultation - Pulmonary Medicine  Olivia Branham 81 y o  male MRN: [id number]  Unit/Bed#: 56077 [facility] 416-01 Encounter: [id number]    Assessment/Plan:    1  Chronic cough with secretions  Patient does not use any nebulizer therapy  I do believe he would benefit from nebulizer therapy and therefore is started on DuoNeb and budesonide  This should help with his cough and loosen up his secretions  2   He is on prednisone for temporal arteritis and this is being weaned  I doubt that this has any relation to his thickening of mucus secretions  3   Chronic fatigue and shortness of Breath may be in relation to underlying lung disease  No known history of emphysema  Will assess response to nebulizer therapy in terms of fatigue and shortness of Breath  I did discuss pulmonary rehab with him however he declines at this time  Discussed in detail with family practice team       Thank you for this consultation; we will be happy to follow with you     ______________________________________________________________________      History of Present Illness   Physician Requesting Consult: Ivonne Valdovinos MD  Reason for Consult / Principal Problem:  Chronic cough  HX and PE limited by:  None    HPI:  Olivia Branham is a 81 y o  male  who presents with weakness  Also noted to have chronic cough that is more tenacious and therefore Pulmonary consultation is requested  Patient states that he has had this cough for approximately 9 years now  He has gone through boxes and boxes of tissues  He has never been on any inhalers  He is not on any oxygen at home  He states that he has been recently started on prednisone for temporal arteritis and since then his mucus has gotten more thick  He denies any fevers  He does have significant GERD and feels like he regurgitates his food that comes out through his trach is well at times  He does not take any medication for GERD    He has a history of laryngeal cancer status post tracheostomy that was placed 9 years ago  He also has associated fatigue  He states that he is usually very active and this spring when he started planting and doing garden work he feels like he is more fatigued and feels like he needs to catch his breath more often  HPI      Review of Systems     Review of systems:  10 point review of systems is complete and all other systems are negative unless mentioned in the HPI    Past Medical/Surgical History  Past Medical History:   Diagnosis Date    Cancer ([address] )     throat    Disease of thyroid gland     Heart murmur     Temporal arteritis (HCC)      Past Surgical History:   Procedure Laterality Date    APPENDECTOMY      TRACHEOSTOMY         Social History  History   Alcohol Use No     Comment: rare     History   Drug Use No     History   Smoking Status    Former Smoker    Packs/day: 1 00    Years: 45 00    Quit date: 5/31/2009   Smokeless Tobacco    Never Used       Family History  History reviewed  No pertinent family history      Allergies  Allergies   Allergen Reactions    Cefdinir        Home Meds:   Prescriptions Prior to Admission   Medication Sig Dispense Refill Last Dose    atorvastatin (LIPITOR) 10 mg tablet Take 1 tablet by mouth daily   5/30/2018 at Unknown time    levothyroxine 75 mcg tablet Take 1 tablet by mouth daily   5/31/2018 at 0630    Multiple Vitamins-Minerals (ICAPS AREDS 2 PO) Apply 2 capsules to the mouth or throat 2 (two) times a day   5/30/2018 at Unknown time    predniSONE 20 mg tablet 4 tabs po od 120 tablet 5 5/31/2018 at 1200    mupirocin (BACTROBAN) 2 % ointment Apply topically 3 (three) times a day 22 g 5 Unknown at Unknown time     Current Meds:   Scheduled Meds:  Current Facility-Administered Medications:  aspirin 81 mg Oral Daily Salma Atwood   atorvastatin 10 mg Oral Daily With Duane Pérez   budesonide 0 5 mg Nebulization Q12H Merced Boggs MD   calcium carbonate 1,000 mg Oral Daily PRN Salma Atwood   enoxaparin 40 mg Subcutaneous Daily Boazsindhu Romero   ipratropium-albuterol 3 mL Nebulization Q6H Francisco Javier Smith MD   levothyroxine 75 mcg Oral Early Morning Nick Briceño   ondansetron 4 mg Intravenous Q6H PRN Boazsindhu Romero   [START ON 6/2/2018] pantoprazole 40 mg Oral Early Morning Francisco Javier Smith MD   predniSONE 40 mg Oral Daily Boaz Romero     PRN Meds:  calcium carbonate 1,000 mg Daily PRN   ondansetron 4 mg Q6H PRN       ____________________________________________________________________    Objective   Vitals:   Temp:  [97 5 °F (36 4 °C)-97 8 °F (36 6 °C)] 97 5 °F (36 4 °C)  HR:  [62-78] 71  Resp:  [16-20] 20  BP: (142-172)/(92-99) 157/99  Weight (last 2 days)     Date/Time   Weight    06/01/18 0600  73 3 (161 6)    05/31/18 1800  75 8 (167 2)    05/31/18 1302  72 6 (160)            Oxygen Therapy  SpO2: 96 %    Nutrition:        Diet Orders            Start     Ordered    06/01/18 1304  Diet Regular; Regular House  Diet effective now     Question Answer Comment   Diet Type Regular    Regular Regular House    RD to adjust diet per protocol? Yes        06/01/18 1304    05/31/18 1736  Room Service  Once     Question:  Type of Service  Answer:  Room Service-Appropriate    05/31/18 1736            Ins/Outs:   I/O     None            Lines/Drains:  Invasive Devices     Peripheral Intravenous Line            Peripheral IV 06/01/18 Right Wrist less than 1 day                ____________________________________________________________________      Physical Exam  On physical exam patient is alert oriented and awake in no acute distress  Tracheostomy is in place site is clean  Cardiovascular S1-S2 no murmurs appreciated  Respiratory revealed coarse breath sounds bilaterally  Abdomen is soft nontender bowel sounds are appreciated  Extremities reveal no edema  Neurologically there are no focal deficits that are appreciated  Skin reveals no rashes or wounds    ____________________________________________________________________    Labs:   CBC: Results from last 7 days  Lab Units 06/01/18  0516 05/31/18  1346   WBC Thousand/uL 6 88 8 39   HEMOGLOBIN g/dL 13 8 14 0   HEMATOCRIT % 43 4 43 4   MCV fL 95 96   PLATELETS Thousands/uL 91* 94*     CMP:   Results from last 7 days  Lab Units 06/01/18  0516 05/31/18  1346   SODIUM mmol/L 138 139   POTASSIUM mmol/L 4 0 3 2*   CHLORIDE mmol/L 105 104   CO2 mmol/L 29 30   BUN mg/dL 20 26*   CREATININE mg/dL 0 86 0 94   GLUCOSE RANDOM mg/dL 90 94   CALCIUM mg/dL 8 0* 7 9*   AST U/L  --  19   ALT U/L  --  39   ALK PHOS U/L  --  45*   TOTAL PROTEIN g/dL  --  5 2*   BILIRUBIN TOTAL mg/dL  --  0 40   EGFR ml/min/1 73sq m 81 75     Magnesium:   Results from last 7 days  Lab Units 06/01/18  0516   MAGNESIUM mg/dL 2 2     Phosphorous:   Results from last 7 days  Lab Units 06/01/18  0516   PHOSPHORUS mg/dL 3 1     Troponin:   Results from last 7 days  Lab Units 06/01/18  0200 05/31/18  2029   TROPONIN I ng/mL 0 04 0 04     PT/INR:   Results from last 7 days  Lab Units 05/31/18  1346   PTT seconds 23*   INR  1 03     Lactic Acid:     BNP:   Results from last 7 days  Lab Units 05/31/18  1346   NT-PRO BNP pg/mL 776*     Imaging:  All imaging is viewed by me      ____________________________________________________________________      Code Status: Level 1 - Full Code

## 2018-06-01 NOTE — SOCIAL WORK
Met with pt  Resides with wife Doni Francois  Pt is independent, but wife needs supportive care  Pt uses no dme  No hhc, but has in the past   Home is one floor with ramp to enter  Has been independent and drives  Explained role of cm, no d/c needs  CM reviewed d/c planning process including the following: identifying help at home, patient preference for d/c planning needs, and availability of the treatment team to discuss questions or concerns patient and/or family may have regarding understanding of medications and recognizing signs and symptoms once discharged  CM also encouraged patient to follow up with all recommended appointments after discharge  Patient advised of importance for patient and family to participate in managing patient's medical well being

## 2018-06-01 NOTE — PLAN OF CARE
DISCHARGE PLANNING     Discharge to home or other facility with appropriate resources Adequate for Discharge        DISCHARGE PLANNING - CARE MANAGEMENT     Discharge to post-acute care or home with appropriate resources Adequate for Discharge        INFECTION - ADULT     Absence or prevention of progression during hospitalization Adequate for Discharge        Knowledge Deficit     Patient/family/caregiver demonstrates understanding of disease process, treatment plan, medications, and discharge instructions Adequate for Discharge        PAIN - ADULT     Verbalizes/displays adequate comfort level or baseline comfort level Adequate for Discharge        Potential for Falls     Patient will remain free of falls Adequate for Discharge        RESPIRATORY - ADULT     Achieves optimal ventilation and oxygenation Adequate for Discharge        SAFETY ADULT     Maintain or return to baseline ADL function Adequate for Discharge     Maintain or return mobility status to optimal level Adequate for Discharge

## 2018-06-01 NOTE — SOCIAL WORK
Script written for pt to receive a nebulizer for home  Referral made to [facility]  Retrieved from [facility]  No other d/c needs

## 2018-06-02 LAB — MRSA NOSE QL CULT: NORMAL

## 2018-06-03 LAB
ATRIAL RATE: 80 BPM
P AXIS: 33 DEGREES
PR INTERVAL: 134 MS
QRS AXIS: -12 DEGREES
QRSD INTERVAL: 82 MS
QT INTERVAL: 364 MS
QTC INTERVAL: 419 MS
T WAVE AXIS: 114 DEGREES
VENTRICULAR RATE: 80 BPM

## 2018-06-03 PROCEDURE — 93010 ELECTROCARDIOGRAM REPORT: CPT | Performed by: INTERNAL MEDICINE

## 2018-06-03 NOTE — DISCHARGE SUMMARY
[facility] Discharge Summary - Medical Kaila Issa 81 y o  male MRN: [id number]    [address]  Room / Bed: [facility] 130-* Encounter: [id number]    BRIEF OVERVIEW  Admitting Provider: Amada Martines MD  Discharge Provider: Dr Amada Martines     Discharge To:  home      Outpatient Follow-Up:   1  CFP: within 1 week   2  Pulmonology every 2 weeks     Things to address at first follow up visit:   1  Fatigue/ weakness  2  Prednisone taper     Labs and results pending at discharge: none    Admission Date: 2018     Discharge Date: 2018  6:09 PM    Primary Discharge Diagnosis  Principal Problem:    Fatigue  Active Problems: Aortic stenosis    Hyperlipidemia    Laryngeal cancer (HCC)    Mitral regurgitation    Temporal arteritis (HCC)    Elevated troponin    Elevated brain natriuretic peptide (BNP) level    Hypertension    Hypokalemia    Centrilobular emphysema (HCC)  Resolved Problems:    * No resolved hospital problems  *      Secondary Discharge Diagnoses  none  Consulting Providers   none        [address] STAY    Procedures Performed/Pertinent Test results  Stress test   Chol: 185, TA, LDL: 107  TSH: 2 13    HPI  As per H & P     81year old male with PMH of HTN, HLD, mild to moderate Aortic stenosis and Mitral regurgitation, h/o of throat cancer and Temporal Arteritis presents to the ED after being sent from [facility] with systolic BP around 97T and complaints of worsening shortness of breath      Over last 6 weeks patient has been noting worsening shortness of breath and OLGUIN  Patient has baseline physical activity of gardening and general maintenance household  Now patient reports OLGUIN going up 1 flight of stairs  Activities like gardening and house work lasting 5 minutes would require almost 10 minutes of rest in between  Patient also has reported nausea, fatigue, and insomnia    Of note patient does attribute shortness of breath to more thicken secretions and mucus causing blocking of trach collar cannula  Patient still has normal appetite, denies any chest pain  Hospital Course  Generalized Fatigue, Dizziness and Shortness of breath:  Possibly secondary to tapering of steroid treatment of temporal arteritis versus cardiac etiology  Patient was on started on 80 mg prednisone and per patient has been tapering down about 10 mg every 3 weeks  Patient presently on 40 mg daily  ER ordered random cortisol resulted 42 9  Spoke With Endocrine insulin and will advise suspicion of adrenal insufficient would have more clinical correlates  Orthostatics were normal in the ED patient was restarted on prednisone 40 mg   CT of the head without contrast showed no intracranial process  Patient is to follow with Rheumatology outpatient    Elevated Troponin: 0 06 POA; likely type 2 NSTEMI from episode of hypotension; EKG NSR with no active chest pain on admission  Repeat troponins were 0 04  EKG showed normal sinus rhythm  Ruby tress test was done that was within normal limits  Morphine and nitrate were given p r n     Aspirin 81 mg were given  At this time patient denies any chest pain      Episodes of Hypotension:   Patient records episodes of systolic BP in 75L-18V with POA 80/50  After receiving 1 L normal saline blood pressure responded with  BPs 100-200s/     History of Grade 1 diastolic dysfunction Elevated BNP: 776 POA  Chest x-ray showed no acute cardiopulmonary disease  Echo was done that showed a EF of 55-60%     Aortic Stenosis/ Mitral Regurtigation (last echocardiogram 2/5/2018)   last echocardiogram EF 55% with Grade 1 DD, Moderate stenosis, FALGUNI 1 35, Mild Mitral Regurgitation     History of Temporal Arteritis Dr Christiano Wilson, a rheumatologist who took over management of patient's temporal arteritis and has been tapering his prednisone down 10 mg every 3 weeks      Patient was continued on current dose prednisone 40 mg qd     HLD:   Chol: 185, TA, LDL: 1070 mg   Continued on Lipitor     Hypothyroidism:   -TSH 2 130 (     Hypokalemia:  Repleted and was remained stable    H/o Throat Cancer and with tracheostomy  Patient was continued on trach care      Medications    aspirin 81 mg chewable tablet   Chew 1 tablet (81 mg total) daily   Refills: 3                    atorvastatin 10 mg tablet   Commonly known as: LIPITOR   Take 1 tablet by mouth daily   Refills: 0                    budesonide 0 5 mg/2 mL nebulizer solution   Commonly known as: PULMICORT   Take 2 mL (0 5 mg total) by nebulization every 12 (twelve) hours   Refills: 0                    ICAPS AREDS 2 PO   Apply 2 capsules to the mouth or throat 2 (two) times a day   Refills: 0                    levothyroxine 75 mcg tablet   Take 1 tablet by mouth daily   Refills: 0                    mupirocin 2 % ointment   Commonly known as: BACTROBAN   Apply topically 3 (three) times a day   Refills: 5                    pantoprazole 40 mg tablet   Commonly known as: PROTONIX   Take 1 tablet (40 mg total) by mouth daily in the early morning   Refills: 0                    predniSONE 20 mg tablet   4 tabs po od   Refills: 5               Allergies  Allergies   Allergen Reactions    Cefdinir        Diet restrictions: Cardiac diet   Activity restrictions: as tolerated  Code Status: Prior  Advance Directive and Living Will: <no information>  Power of :    POLST:      Discharge Condition: stable      Discharge  Statement   I spent 25 minutes discharging the patient  This time was spent on the day of discharge  I had direct contact with the patient on the day of discharge  Additional documentation is required if more than 30 minutes were spent on discharge

## 2018-06-04 LAB
CHEST PAIN STATEMENT: NORMAL
MAX DIASTOLIC BP: 98 MMHG
MAX HEART RATE: 75 BPM
MAX PREDICTED HEART RATE: 138 BPM
MAX. SYSTOLIC BP: 153 MMHG
PROTOCOL NAME: NORMAL
REASON FOR TERMINATION: NORMAL
TARGET HR FORMULA: NORMAL
TEST INDICATION: NORMAL
TIME IN EXERCISE PHASE: NORMAL

## 2018-06-11 DIAGNOSIS — R53.1 WEAKNESS: ICD-10-CM

## 2018-06-11 RX ORDER — BUDESONIDE 0.5 MG/2ML
0.5 INHALANT ORAL
Qty: 60 ML | Refills: 0 | Status: SHIPPED | OUTPATIENT
Start: 2018-06-11 | End: 2018-06-20

## 2018-06-19 LAB
BASOPHILS # BLD AUTO: 8 CELLS/UL (ref 0–200)
BASOPHILS NFR BLD AUTO: 0.1 %
BUN SERPL-MCNC: 26 MG/DL (ref 7–25)
BUN/CREAT SERPL: 26 (CALC) (ref 6–22)
CALCIUM SERPL-MCNC: 9.2 MG/DL (ref 8.6–10.3)
CHLORIDE SERPL-SCNC: 101 MMOL/L (ref 98–110)
CO2 SERPL-SCNC: 26 MMOL/L (ref 20–31)
CREAT SERPL-MCNC: 1.01 MG/DL (ref 0.7–1.11)
CRP SERPL-MCNC: 45.3 MG/L
EOSINOPHIL # BLD AUTO: 8 CELLS/UL (ref 15–500)
EOSINOPHIL NFR BLD AUTO: 0.1 %
ERYTHROCYTE [DISTWIDTH] IN BLOOD BY AUTOMATED COUNT: 14.9 % (ref 11–15)
ERYTHROCYTE [SEDIMENTATION RATE] IN BLOOD BY WESTERGREN METHOD: 25 MM/H
GLUCOSE SERPL-MCNC: 70 MG/DL (ref 65–99)
HCT VFR BLD AUTO: 47 % (ref 38.5–50)
HGB BLD-MCNC: 15.8 G/DL (ref 13.2–17.1)
LYMPHOCYTES # BLD AUTO: 770 CELLS/UL (ref 850–3900)
LYMPHOCYTES NFR BLD AUTO: 9.5 %
MCH RBC QN AUTO: 30.9 PG (ref 27–33)
MCHC RBC AUTO-ENTMCNC: 33.6 G/DL (ref 32–36)
MCV RBC AUTO: 92 FL (ref 80–100)
MONOCYTES # BLD AUTO: 632 CELLS/UL (ref 200–950)
MONOCYTES NFR BLD AUTO: 7.8 %
NEUTROPHILS # BLD AUTO: 6683 CELLS/UL (ref 1500–7800)
NEUTROPHILS NFR BLD AUTO: 82.5 %
PLATELET # BLD AUTO: 155 THOUSAND/UL (ref 140–400)
PMV BLD REES-ECKER: 10.9 FL (ref 7.5–12.5)
POTASSIUM SERPL-SCNC: 4.5 MMOL/L (ref 3.5–5.3)
RBC # BLD AUTO: 5.11 MILLION/UL (ref 4.2–5.8)
SL AMB EGFR AFRICAN AMERICAN: 80 ML/MIN/1.73M2
SL AMB EGFR NON AFRICAN AMERICAN: 69 ML/MIN/1.73M2
SODIUM SERPL-SCNC: 139 MMOL/L (ref 135–146)
WBC # BLD AUTO: 8.1 THOUSAND/UL (ref 3.8–10.8)

## 2018-06-20 ENCOUNTER — OFFICE VISIT (OUTPATIENT)
Dept: PULMONOLOGY | Facility: MEDICAL CENTER | Age: 83
End: 2018-06-20
Payer: COMMERCIAL

## 2018-06-20 ENCOUNTER — APPOINTMENT (EMERGENCY)
Dept: RADIOLOGY | Facility: HOSPITAL | Age: 83
End: 2018-06-20
Payer: COMMERCIAL

## 2018-06-20 ENCOUNTER — HOSPITAL ENCOUNTER (EMERGENCY)
Facility: HOSPITAL | Age: 83
Discharge: HOME | End: 2018-06-20
Attending: EMERGENCY MEDICINE | Admitting: EMERGENCY MEDICINE
Payer: COMMERCIAL

## 2018-06-20 ENCOUNTER — TELEPHONE (OUTPATIENT)
Dept: PULMONOLOGY | Facility: MEDICAL CENTER | Age: 83
End: 2018-06-20

## 2018-06-20 VITALS
OXYGEN SATURATION: 96 % | HEART RATE: 86 BPM | TEMPERATURE: 97.6 F | WEIGHT: 160 LBS | HEIGHT: 66 IN | RESPIRATION RATE: 20 BRPM | BODY MASS INDEX: 25.71 KG/M2 | DIASTOLIC BLOOD PRESSURE: 88 MMHG | SYSTOLIC BLOOD PRESSURE: 118 MMHG

## 2018-06-20 VITALS
HEART RATE: 82 BPM | DIASTOLIC BLOOD PRESSURE: 76 MMHG | RESPIRATION RATE: 20 BRPM | WEIGHT: 160 LBS | OXYGEN SATURATION: 94 % | HEIGHT: 66 IN | TEMPERATURE: 96.7 F | SYSTOLIC BLOOD PRESSURE: 133 MMHG | BODY MASS INDEX: 25.71 KG/M2

## 2018-06-20 DIAGNOSIS — S22.000A COMPRESSION FRACTURE OF BODY OF THORACIC VERTEBRA (HCC): ICD-10-CM

## 2018-06-20 DIAGNOSIS — J18.9 PNEUMONIA: Primary | ICD-10-CM

## 2018-06-20 DIAGNOSIS — C32.9 LARYNGEAL CANCER (HCC): ICD-10-CM

## 2018-06-20 DIAGNOSIS — J43.2 CENTRILOBULAR EMPHYSEMA (HCC): Primary | ICD-10-CM

## 2018-06-20 DIAGNOSIS — M54.6 ACUTE RIGHT-SIDED THORACIC BACK PAIN: ICD-10-CM

## 2018-06-20 LAB
ALBUMIN SERPL BCP-MCNC: 2.8 G/DL (ref 3.5–5)
ALP SERPL-CCNC: 51 U/L (ref 46–116)
ALT SERPL W P-5'-P-CCNC: 44 U/L (ref 12–78)
ANION GAP SERPL CALCULATED.3IONS-SCNC: 11 MMOL/L (ref 4–13)
APTT PPP: 25 SECONDS (ref 24–36)
AST SERPL W P-5'-P-CCNC: 25 U/L (ref 5–45)
BASOPHILS # BLD AUTO: 0.01 THOUSANDS/ΜL (ref 0–0.1)
BASOPHILS NFR BLD AUTO: 0 % (ref 0–1)
BILIRUB SERPL-MCNC: 0.6 MG/DL (ref 0.2–1)
BUN SERPL-MCNC: 23 MG/DL (ref 5–25)
CALCIUM SERPL-MCNC: 8.8 MG/DL (ref 8.3–10.1)
CHLORIDE SERPL-SCNC: 102 MMOL/L (ref 100–108)
CO2 SERPL-SCNC: 26 MMOL/L (ref 21–32)
CREAT SERPL-MCNC: 0.95 MG/DL (ref 0.6–1.3)
DEPRECATED D DIMER PPP: 5940 NG/ML (FEU) (ref 190–520)
EOSINOPHIL # BLD AUTO: 0.06 THOUSAND/ΜL (ref 0–0.61)
EOSINOPHIL NFR BLD AUTO: 1 % (ref 0–6)
ERYTHROCYTE [DISTWIDTH] IN BLOOD BY AUTOMATED COUNT: 15.9 % (ref 11.6–15.1)
GFR SERPL CREATININE-BSD FRML MDRD: 74 ML/MIN/1.73SQ M
GLUCOSE SERPL-MCNC: 85 MG/DL (ref 65–140)
HCT VFR BLD AUTO: 47.3 % (ref 36.5–49.3)
HGB BLD-MCNC: 14.7 G/DL (ref 12–17)
IMM GRANULOCYTES # BLD AUTO: 0.04 THOUSAND/UL (ref 0–0.2)
IMM GRANULOCYTES NFR BLD AUTO: 0 % (ref 0–2)
INR PPP: 1.03 (ref 0.86–1.16)
LYMPHOCYTES # BLD AUTO: 0.72 THOUSANDS/ΜL (ref 0.6–4.47)
LYMPHOCYTES NFR BLD AUTO: 8 % (ref 14–44)
MCH RBC QN AUTO: 30.7 PG (ref 26.8–34.3)
MCHC RBC AUTO-ENTMCNC: 31.1 G/DL (ref 31.4–37.4)
MCV RBC AUTO: 99 FL (ref 82–98)
MONOCYTES # BLD AUTO: 0.68 THOUSAND/ΜL (ref 0.17–1.22)
MONOCYTES NFR BLD AUTO: 7 % (ref 4–12)
NEUTROPHILS # BLD AUTO: 7.67 THOUSANDS/ΜL (ref 1.85–7.62)
NEUTS SEG NFR BLD AUTO: 84 % (ref 43–75)
NRBC BLD AUTO-RTO: 0 /100 WBCS
NT-PROBNP SERPL-MCNC: 702 PG/ML
PLATELET # BLD AUTO: 138 THOUSANDS/UL (ref 149–390)
PMV BLD AUTO: 10.3 FL (ref 8.9–12.7)
POTASSIUM SERPL-SCNC: 3.6 MMOL/L (ref 3.5–5.3)
PROT SERPL-MCNC: 6.2 G/DL (ref 6.4–8.2)
PROTHROMBIN TIME: 10.8 SECONDS (ref 9.4–11.7)
RBC # BLD AUTO: 4.79 MILLION/UL (ref 3.88–5.62)
SODIUM SERPL-SCNC: 139 MMOL/L (ref 136–145)
TROPONIN I SERPL-MCNC: <0.02 NG/ML
WBC # BLD AUTO: 9.18 THOUSAND/UL (ref 4.31–10.16)

## 2018-06-20 PROCEDURE — 72072 X-RAY EXAM THORAC SPINE 3VWS: CPT

## 2018-06-20 PROCEDURE — 85379 FIBRIN DEGRADATION QUANT: CPT | Performed by: EMERGENCY MEDICINE

## 2018-06-20 PROCEDURE — 84484 ASSAY OF TROPONIN QUANT: CPT | Performed by: EMERGENCY MEDICINE

## 2018-06-20 PROCEDURE — 85730 THROMBOPLASTIN TIME PARTIAL: CPT | Performed by: EMERGENCY MEDICINE

## 2018-06-20 PROCEDURE — 83880 ASSAY OF NATRIURETIC PEPTIDE: CPT | Performed by: EMERGENCY MEDICINE

## 2018-06-20 PROCEDURE — 96376 TX/PRO/DX INJ SAME DRUG ADON: CPT

## 2018-06-20 PROCEDURE — 99284 EMERGENCY DEPT VISIT MOD MDM: CPT

## 2018-06-20 PROCEDURE — 93005 ELECTROCARDIOGRAM TRACING: CPT

## 2018-06-20 PROCEDURE — 80053 COMPREHEN METABOLIC PANEL: CPT | Performed by: EMERGENCY MEDICINE

## 2018-06-20 PROCEDURE — 87040 BLOOD CULTURE FOR BACTERIA: CPT | Performed by: EMERGENCY MEDICINE

## 2018-06-20 PROCEDURE — 71275 CT ANGIOGRAPHY CHEST: CPT

## 2018-06-20 PROCEDURE — 36415 COLL VENOUS BLD VENIPUNCTURE: CPT | Performed by: EMERGENCY MEDICINE

## 2018-06-20 PROCEDURE — 99205 OFFICE O/P NEW HI 60 MIN: CPT | Performed by: INTERNAL MEDICINE

## 2018-06-20 PROCEDURE — 96374 THER/PROPH/DIAG INJ IV PUSH: CPT

## 2018-06-20 PROCEDURE — 85610 PROTHROMBIN TIME: CPT | Performed by: EMERGENCY MEDICINE

## 2018-06-20 PROCEDURE — 85025 COMPLETE CBC W/AUTO DIFF WBC: CPT | Performed by: EMERGENCY MEDICINE

## 2018-06-20 RX ORDER — AZITHROMYCIN 250 MG/1
250 TABLET, FILM COATED ORAL DAILY
Qty: 4 TABLET | Refills: 0 | Status: SHIPPED | OUTPATIENT
Start: 2018-06-20 | End: 2018-06-24

## 2018-06-20 RX ORDER — TRAMADOL HYDROCHLORIDE 50 MG/1
50 TABLET ORAL EVERY 6 HOURS PRN
Qty: 15 TABLET | Refills: 0 | Status: SHIPPED | OUTPATIENT
Start: 2018-06-20 | End: 2018-06-28 | Stop reason: SDUPTHER

## 2018-06-20 RX ORDER — AZITHROMYCIN 250 MG/1
500 TABLET, FILM COATED ORAL ONCE
Status: COMPLETED | OUTPATIENT
Start: 2018-06-20 | End: 2018-06-20

## 2018-06-20 RX ORDER — MORPHINE SULFATE 4 MG/ML
2 INJECTION, SOLUTION INTRAMUSCULAR; INTRAVENOUS ONCE
Status: COMPLETED | OUTPATIENT
Start: 2018-06-20 | End: 2018-06-20

## 2018-06-20 RX ADMIN — IOHEXOL 85 ML: 350 INJECTION, SOLUTION INTRAVENOUS at 11:50

## 2018-06-20 RX ADMIN — MORPHINE SULFATE 2 MG: 4 INJECTION, SOLUTION INTRAMUSCULAR; INTRAVENOUS at 10:33

## 2018-06-20 RX ADMIN — AZITHROMYCIN MONOHYDRATE 500 MG: 250 TABLET ORAL at 12:46

## 2018-06-20 RX ADMIN — MORPHINE SULFATE 2 MG: 4 INJECTION, SOLUTION INTRAMUSCULAR; INTRAVENOUS at 15:05

## 2018-06-20 NOTE — PROGRESS NOTES
Assessment/Plan:       Problem List Items Addressed This Visit        Respiratory    Laryngeal cancer ([facility] [address] )    Centrilobular emphysema ([facility] [address] ) - Primary     This is as assessed on CT chest performed in prior hospitalization  Also patient had chronic bronchitis  This is likely secondary to smoking as well  I doubt that the budesonide is causing his back symptoms  If anything the oral prednisone is a big culprit for muscle weakness and fractures  However at this time I do not feel he will require the budesonide further  He is asked to continue to use Duoneb but on a regular basis twice daily  Other    Acute right-sided thoracic back pain     Patient has acute severe right upper back pain  It is reproducible on palpation and he is significant impairment with his breathing due to this  He did try muscle relaxant at home as well as OTC tylenol without any relief  Given chronic steroid use and lack of response to the medications, I recommend he go to the ER to be evaluated/imaged if needed  Patient is agreeable to this at this time  I did call and discuss this with the ER physician  If admitted consider bronchoscopy to assist with mucus clearance due to most recent difficulty due to severe pain    Follow-up depending on hospitalization status  All questions are answered to the patient's satisfaction and understanding  He verbalizes understanding  He is encouraged to call with any further questions or concerns  Portions of the record may have been created with voice recognition software  Occasional wrong word or "sound a like" substitutions may have occurred due to the inherent limitations of voice recognition software  Read the chart carefully and recognize, using context, where substitutions have occurred      Electronically Signed by Suzie Reed MD    ______________________________________________________________________    Chief Complaint:   Chief Complaint   Patient [place] with   [facility] [place]     [address]    Medication Dose Change        Patient ID: Angel Ball is a 81 y o  y o  male has a past medical history of Arthritis; Cancer ([facility] [address] ); Cough; Disease of thyroid gland; Emphysema lung ([facility] [address] ); Heart murmur; SOB (shortness of breath); Temporal arteritis ([facility] [address] ); Throat cancer ([facility] [address] ); and Thyroid disease  6/20/2018  Patient presents today for initial visit post hospitalization  He presents today with his son  He states that over the last week he has been having excruciating right upper back pain and feels like his right arm is swollen  He denies any trauma  Due to this back pain he is unable to cough like he used to  Therefore he is experiencing extreme mucus plugging to the point he has to pull out his trach in order to breathe  Then he is able to bring up thick gobs of mucus  The pain arose out of his sleep  It is sharp and 8/10  No fevers chills or night sweats  Upon previous discharge, he was placed on Duoneb as needed and Pulmicort  He has been using the pulmicort regularly and initially for the first week he states that this helped him  However since developing this back pain he feels worse  He wonders if the pulmicort is causing his weakness/muscle pain  He remains on Prednisone 30 mg at this time for his temporal arteritis and this is being weaned slowly  This was diagnosed in February  He does have a history of lower back pain and sees a chiropractor  When he saw his chiropractor last week for this pain he was told that he will need seek medical attention  He was also prescribed with Protonix upon discharge due to chronic GERD and gricel reflux of food and contents that also comes out of his trach  He states that despite the protonix he still has this problem  He has a history of laryngeal cancer and is s/p trach about 9 years ago  Since then he has had difficulty with excess mucus and chronic bronchitis   He goes through multiple tissues per day  Review of Systems   Constitutional: Positive for activity change and fatigue  Negative for fever and unexpected weight change  HENT: Positive for congestion  Eyes: Negative for visual disturbance  Respiratory: Positive for cough, chest tightness and shortness of breath  Cardiovascular: Negative for chest pain and leg swelling  Gastrointestinal: Positive for nausea  Negative for abdominal distention and diarrhea  Endocrine: Negative for polyuria  Genitourinary: Negative for difficulty urinating  Musculoskeletal: Positive for arthralgias and back pain  Skin: Negative for color change and pallor  Allergic/Immunologic: Positive for immunocompromised state (chronic steroids)  Negative for environmental allergies  Neurological: Negative for dizziness, weakness and light-headedness  Hematological: Negative for adenopathy  Psychiatric/Behavioral: Negative for agitation and behavioral problems  Social history: He reports that he quit smoking about 9 years ago  He has a 60 00 pack-year smoking history  He has never used smokeless tobacco  He reports that he drinks alcohol  He reports that he does not use drugs  Past surgical history:   Past Surgical History:   Procedure Laterality Date    APPENDECTOMY      TRACHEOSTOMY       Family history:   Family History   Problem Relation Age of Onset    Diabetes Family     Arthritis Family     Cancer Family     Hypertension Family     Heart disease Family        There is no immunization history for the selected administration types on file for this patient  No current facility-administered medications for this visit        Current Outpatient Prescriptions   Medication Sig Dispense Refill    aspirin 81 mg chewable tablet Chew 1 tablet (81 mg total) daily 30 tablet 3    atorvastatin (LIPITOR) 10 mg tablet Take 1 tablet by mouth daily      budesonide (PULMICORT) 0 5 mg/2 mL nebulizer solution Take 1 vial (0 5 mg total) by nebulization every 12 (twelve) hours 60 mL 0    levothyroxine 75 mcg tablet Take 1 tablet by mouth daily      Multiple Vitamins-Minerals (ICAPS AREDS 2 PO) Apply 2 capsules to the mouth or throat 2 (two) times a day      mupirocin (BACTROBAN) 2 % ointment Apply topically 3 (three) times a day 22 g 5    pantoprazole (PROTONIX) 40 mg tablet Take 1 tablet (40 mg total) by mouth daily in the early morning 30 tablet 0    predniSONE 20 mg tablet 4 tabs po od 120 tablet 5     Allergies: Cefdinir and Other    Objective:  Vitals:    06/20/18 0758   BP: 118/88   BP Location: Left arm   Patient Position: Sitting   Cuff Size: Adult   Pulse: 86   Resp: 20   Temp: 97 6 °F (36 4 °C)   TempSrc: Oral   SpO2: 96%   Weight: 72 6 kg (160 lb)   Height: 5' 6" (1 676 m)   Oxygen Therapy  SpO2: 96 %    Wt Readings from Last 3 Encounters:   06/20/18 72 6 kg (160 lb)   06/20/18 72 6 kg (160 lb)   06/01/18 73 3 kg (161 lb 9 6 oz)     Body mass index is 25 82 kg/m²  Physical Exam   Constitutional: He is oriented to person, place, and time  He appears well-nourished  He appears distressed (moderate painful distress)  HENT:   Head: Atraumatic    +tracheostomy in place   Eyes: EOM are normal    Neck: Neck supple  Cardiovascular: Normal rate and regular rhythm  Pulmonary/Chest:   Decreased effort due to pain  Abdominal: Soft  Musculoskeletal: Normal range of motion  He exhibits no edema  Neurological: He is alert and oriented to person, place, and time  Skin: Skin is warm and dry  Psychiatric: He has a normal mood and affect  His behavior is normal    Vitals reviewed        Lab Review: reviewed  Orders Only on 06/18/2018   Component Date Value    SL AMB GLUCOSE 06/18/2018 70     BUN 06/18/2018 26*    Creatinine, Serum 06/18/2018 1 01     eGFR Non  06/18/2018 69     SL AMB EGFR  AMER* 06/18/2018 80     SL AMB BUN/CREATININE RA* 06/18/2018 26*    SL AMB SODIUM 06/18/2018 139     SL AMB POTASSIUM 06/18/2018 4 5     SL AMB CHLORIDE 06/18/2018 101     SL AMB CARBON DIOXIDE 06/18/2018 26     SL AMB CALCIUM 06/18/2018 9 2     SL AMB SED RATE BY MODIF* 06/18/2018 25*    SL AMB LAB WHITE BLOOD C* 06/18/2018 8 1     SL AMB LAB RED BLOOD ANDRIA* 06/18/2018 5 11     Hemoglobin 06/18/2018 15 8     Hematocrit 06/18/2018 47 0     MCV 06/18/2018 92 0     MCH 06/18/2018 30 9     MCHC 06/18/2018 33 6     RDW 06/18/2018 14 9     Platelet Count 03/21/8039 155     SL AMB MPV 06/18/2018 10 9     Neutrophils (Absolute) 06/18/2018 6683     Lymphocytes (Absolute) 06/18/2018 770*    Monocytes (Absolute) 06/18/2018 632     Eosinophils (Absolute) 06/18/2018 8*    Basophils (Absolute) 06/18/2018 8     Neutrophils 06/18/2018 82 5     Lymphocytes 06/18/2018 9 5     Monocytes 06/18/2018 7 8     Eosinophils 06/18/2018 0 1     Basophils 06/18/2018 0 1     C-Reactive Protein, Quant 06/18/2018 45 3*   Admission on 05/31/2018, Discharged on 06/01/2018   Component Date Value    WBC 05/31/2018 8 39     RBC 05/31/2018 4 54     Hemoglobin 05/31/2018 14 0     Hematocrit 05/31/2018 43 4     MCV 05/31/2018 96     MCH 05/31/2018 30 8     MCHC 05/31/2018 32 3     RDW 05/31/2018 16 9*    MPV 05/31/2018 11 4     Platelets 22/40/8615 94*    nRBC 05/31/2018 0     Neutrophils Relative 05/31/2018 79*    Immat GRANS % 05/31/2018 1     Lymphocytes Relative 05/31/2018 10*    Monocytes Relative 05/31/2018 9     Eosinophils Relative 05/31/2018 1     Basophils Relative 05/31/2018 0     Neutrophils Absolute 05/31/2018 6 72     Immature Grans Absolute 05/31/2018 0 05     Lymphocytes Absolute 05/31/2018 0 81     Monocytes Absolute 05/31/2018 0 76     Eosinophils Absolute 05/31/2018 0 05     Basophils Absolute 05/31/2018 0 00     Protime 05/31/2018 10 8     INR 05/31/2018 1 03     PTT 05/31/2018 23*    Sodium 05/31/2018 139     Potassium 05/31/2018 3 2*    Chloride 05/31/2018 104     CO2 05/31/2018 30  Anion Gap 05/31/2018 5     BUN 05/31/2018 26*    Creatinine 05/31/2018 0 94     Glucose 05/31/2018 94     Calcium 05/31/2018 7 9*    AST 05/31/2018 19     ALT 05/31/2018 39     Alkaline Phosphatase 05/31/2018 45*    Total Protein 05/31/2018 5 2*    Albumin 05/31/2018 2 5*    Total Bilirubin 05/31/2018 0 40     eGFR 05/31/2018 75     TSH 3RD GENERATON 05/31/2018 2 130     Cortisol, Random 05/31/2018 42 9     Color, UA 05/31/2018 Yellow     Clarity, UA 05/31/2018 Clear     Specific Gravity, UA 05/31/2018 1 010     pH, UA 05/31/2018 7 0     Leukocytes, UA 05/31/2018 Negative     Nitrite, UA 05/31/2018 Negative     Protein, UA 05/31/2018 Negative     Glucose, UA 05/31/2018 Negative     Ketones, UA 05/31/2018 Negative     Urobilinogen, UA 05/31/2018 0 2     Bilirubin, UA 05/31/2018 Negative     Blood, UA 05/31/2018 Negative     Magnesium 05/31/2018 1 8     Phosphorus 05/31/2018 2 8     NT-proBNP 05/31/2018 776*    Troponin I 05/31/2018 0 06*    MRSA Culture Only 05/31/2018 No Methicillin Resistant Staphlyococcus aureus (MRSA) isolated     Troponin I 05/31/2018 0 04     Troponin I 06/01/2018 0 04     Sodium 06/01/2018 138     Potassium 06/01/2018 4 0     Chloride 06/01/2018 105     CO2 06/01/2018 29     Anion Gap 06/01/2018 4     BUN 06/01/2018 20     Creatinine 06/01/2018 0 86     Glucose 06/01/2018 90     Calcium 06/01/2018 8 0*    eGFR 06/01/2018 81     Magnesium 06/01/2018 2 2     Phosphorus 06/01/2018 3 1     WBC 06/01/2018 6 88     RBC 06/01/2018 4 58     Hemoglobin 06/01/2018 13 8     Hematocrit 06/01/2018 43 4     MCV 06/01/2018 95     MCH 06/01/2018 30 1     MCHC 06/01/2018 31 8     RDW 06/01/2018 17 1*    Platelets 72/20/4871 91*    MPV 06/01/2018 10 5     Cholesterol 06/01/2018 185     Triglycerides 06/01/2018 81     HDL, Direct 06/01/2018 78*    LDL Calculated 06/01/2018 91     Non-HDL-Chol (CHOL-HDL) 06/01/2018 107     Ventricular Rate 06/01/2018 64     Atrial Rate 06/01/2018 64     ME Interval 06/01/2018 140     QRSD Interval 06/01/2018 84     QT Interval 06/01/2018 410     QTC Interval 06/01/2018 422     P Axis 06/01/2018 49     QRS Axis 06/01/2018 9     T Wave Axis 06/01/2018 74     Sputum Culture 06/01/2018 Test not performed  Suggest repeat specimen   Gram Stain Result 06/01/2018 >10 squamous epithelial cells/lpf, indicating orophayngeal contamination   Gram Stain Result 06/01/2018 Rare Polys     Gram Stain Result 06/01/2018 3+ Gram positive cocci in pairs, chains and clusters     Gram Stain Result 06/01/2018 2+ Gram negative rods     Gram Stain Result 06/01/2018 1+ Gram positive rods     Ventricular Rate 05/31/2018 80     Atrial Rate 05/31/2018 80     ME Interval 05/31/2018 134     QRSD Interval 05/31/2018 82     QT Interval 05/31/2018 364     QTC Interval 05/31/2018 419     P Axis 05/31/2018 33     QRS Axis 05/31/2018 -12     T Wave [place] 05/31/2018 114     Protocol Name 06/01/2018 LEXISCAN     Time In Exercise Phase 06/01/2018 00:01:00     MAX   SYSTOLIC BP 59/87/5938 824     Max Diastolic Bp 99/82/1892 98     Max Heart Rate 06/01/2018 75     Max Predicted Heart Rate 06/01/2018 138     Reason for Termination 06/01/2018 PROTOCOL COMPLETED     Test Indication 06/01/2018 SOB     Target Hr Formular 06/01/2018 (220 - Age)*100%     Chest Pain Statement 06/01/2018 none    Orders Only on 05/11/2018   Component Date Value    SL AMB GLUCOSE 05/11/2018 62*    BUN 05/11/2018 26*    Creatinine, Serum 05/11/2018 0 96     eGFR Non African American 05/11/2018 74     SL AMB EGFR  AMER* 05/11/2018 86     SL AMB BUN/CREATININE RA* 05/11/2018 27*    SL AMB SODIUM 05/11/2018 140     SL AMB POTASSIUM 05/11/2018 4 0     SL AMB CHLORIDE 05/11/2018 104     SL AMB CARBON DIOXIDE 05/11/2018 29     SL AMB CALCIUM 05/11/2018 8 6     SL AMB SED RATE BY MODIF* 05/11/2018 6     SL AMB LAB WHITE BLOOD C* 05/11/2018 7  6     SL AMB LAB RED BLOOD ANDRIA* 05/11/2018 5 22     Hemoglobin 05/11/2018 15 8     Hematocrit 05/11/2018 48 0     MCV 05/11/2018 92 0     MCH 05/11/2018 30 3     MCHC 05/11/2018 32 9     RDW 05/11/2018 16 0*    Neutrophils (Absolute) 05/11/2018 6065     Lymphocytes (Absolute) 05/11/2018 699*    Monocytes (Absolute) 05/11/2018 806     Eosinophils (Absolute) 05/11/2018 23     Basophils (Absolute) 05/11/2018 8     Neutrophils 05/11/2018 79 8     Lymphocytes 05/11/2018 9 2     Monocytes 05/11/2018 10 6     Eosinophils 05/11/2018 0 3     Basophils 05/11/2018 0 1     SL AMB COMMENT(S) 05/11/2018 Review of peripheral smear confirms automated results   Platelet Count 32/92/9733 TNP     C-Reactive Protein, Quant 05/11/2018 9 0*       Diagnostics:  I have personally reviewed pertinent reports  and I have personally reviewed pertinent films in PACS    Xr Chest 1 View Portable    Result Date: 5/31/2018  Narrative: CHEST INDICATION:   hypotension  FATIGUE  HYPOTENSION     PT HAD CANCER OF THROAT HAS TRACHAEOSTOMY COMPARISON:  Chest x-ray dated 4/17/2018 EXAM PERFORMED/VIEWS:  XR CHEST PORTABLE FINDINGS: Tracheostomy tube is in the midline, the tip terminates approximately 4 9 cm from the anastasia  The aorta is calcified and tortuous, as before  The cardiac and mediastinal contours otherwise appear unremarkable  The lungs are clear  No pneumothorax or pleural effusion  Previously seen compression fractures in the lower thoracic spine not well redemonstrated, the osseous structures otherwise appear within normal limits for patient age  Impression: No acute cardiopulmonary disease  Other nonacute findings as above   Workstation performed: [id number]

## 2018-06-20 NOTE — ED PROVIDER NOTES
History  Chief Complaint   Patient presents with    Back Pain     mid thoracic back pain x 1 week   Pt has long term trach, c/o pain with cough so pt is unable to clear mucus and trach has been clogged     82 yo male with mid thoracic back pain x 1 week   Pt has long term trach, c/o pain with cough and is unable to clear mucus and trach has been clogged x 10 days  No trauma, no known injury  Pain has progressed  (+) chronic cough (+) trach from throat cancer  Patient is not on chemo or radiation, last treatment   No hs of dvt/pe  History provided by:  Patient   used: No        Prior to Admission Medications   Prescriptions Last Dose Informant Patient Reported? Taking?    Multiple Vitamins-Minerals (ICAPS AREDS 2 PO)  Self Yes No   Sig: Apply 2 capsules to the mouth or throat 2 (two) times a day   aspirin 81 mg chewable tablet  Self No No   Sig: Chew 1 tablet (81 mg total) daily   atorvastatin (LIPITOR) 10 mg tablet  Self Yes No   Sig: Take 1 tablet by mouth daily   levothyroxine 75 mcg tablet  Self Yes No   Sig: Take 1 tablet by mouth daily   mupirocin (BACTROBAN) 2 % ointment  Self No No   Sig: Apply topically 3 (three) times a day   pantoprazole (PROTONIX) 40 mg tablet  Self No No   Sig: Take 1 tablet (40 mg total) by mouth daily in the early morning   predniSONE 20 mg tablet  Self No No   Si tabs po od      Facility-Administered Medications: None       Past Medical History:   Diagnosis Date    Arthritis     Cancer (HCC)     throat    Cough     Disease of thyroid gland     Emphysema lung (HCC)     Heart murmur     SOB (shortness of breath)     Temporal arteritis (HCC)     Throat cancer (HCC)     Thyroid disease        Past Surgical History:   Procedure Laterality Date    APPENDECTOMY      TRACHEOSTOMY         Family History   Problem Relation Age of Onset    Diabetes Family     Arthritis Family     Cancer Family     Hypertension Family     Heart disease Family      I have reviewed and agree with the history as documented  Social History   Substance Use Topics    Smoking status: Former Smoker     Packs/day: 1 00     Years: 60 00     Quit date: 5/31/2009    Smokeless tobacco: Never Used    Alcohol use Yes      Comment: rare        Review of Systems   Respiratory: Positive for cough and shortness of breath  Musculoskeletal: Positive for back pain  All other systems reviewed and are negative  Physical Exam  Physical Exam   Constitutional: He is oriented to person, place, and time  He appears well-developed and well-nourished  HENT:   Head: Normocephalic and atraumatic  Eyes: EOM are normal  Pupils are equal, round, and reactive to light  Neck: Normal range of motion  Neck supple  Cardiovascular: Normal rate and regular rhythm  Pulmonary/Chest: Effort normal and breath sounds normal    Abdominal: Soft  Bowel sounds are normal    Musculoskeletal: He exhibits tenderness  Positive parathoracic tenderness, no midline tenderness   Neurological: He is alert and oriented to person, place, and time  Skin: Skin is warm and dry  Capillary refill takes less than 2 seconds  Psychiatric: He has a normal mood and affect  His behavior is normal    Nursing note and vitals reviewed        Vital Signs  ED Triage Vitals   Temperature Pulse Respirations Blood Pressure SpO2   06/20/18 0850 06/20/18 0852 06/20/18 0852 06/20/18 0852 06/20/18 0852   (!) 96 7 °F (35 9 °C) 77 22 (!) 118/113 96 %      Temp Source Heart Rate Source Patient Position - Orthostatic VS BP Location FiO2 (%)   06/20/18 0850 06/20/18 0852 06/20/18 0852 06/20/18 0852 --   Tympanic Monitor Sitting Right arm       Pain Score       06/20/18 0852       Worst Possible Pain           Vitals:    06/20/18 1244 06/20/18 1426 06/20/18 1430 06/20/18 1502   BP: (!) 169/110 119/83 119/83 133/76   Pulse: 79 63  82   Patient Position - Orthostatic VS: Sitting Lying         Visual Acuity      ED Medications  Medications   morphine (PF) 4 mg/mL injection 2 mg (2 mg Intravenous Given 6/20/18 1033)   iohexol (OMNIPAQUE) 350 MG/ML injection (MULTI-DOSE) 85 mL (85 mL Intravenous Given 6/20/18 1150)   azithromycin (ZITHROMAX) tablet 500 mg (500 mg Oral Given 6/20/18 1246)   morphine (PF) 4 mg/mL injection 2 mg (2 mg Intravenous Given 6/20/18 1505)       Diagnostic Studies  Results Reviewed     Procedure Component Value Units Date/Time    Blood culture #1 [[id number]] Collected:  06/20/18 1035    Lab Status:  Preliminary result Specimen:  Blood from Arm, Right Updated:  06/24/18 1501     Blood Culture No Growth After 4 Days  Blood culture #2 [[id number]] Collected:  06/20/18 1039    Lab Status:  Preliminary result Specimen:  Blood from Arm, Right Updated:  06/24/18 1501     Blood Culture No Growth After 4 Days      NT-BNP PRO [[id number]]  (Abnormal) Collected:  06/20/18 1039    Lab Status:  Final result Specimen:  Blood from Arm, Right Updated:  06/20/18 1126     NT-proBNP 702 (H) pg/mL     D-Dimer [[id number]]  (Abnormal) Collected:  06/20/18 1039    Lab Status:  Final result Specimen:  Blood from Arm, Right Updated:  06/20/18 1125     D-Dimer, Quant 5,940 (H) ng/ml (FEU)     Troponin I [[id number]]  (Normal) Collected:  06/20/18 1039    Lab Status:  Final result Specimen:  Blood from Arm, Right Updated:  06/20/18 1124     Troponin I <0 02 ng/mL     Comprehensive metabolic panel [[id number]]  (Abnormal) Collected:  06/20/18 1039    Lab Status:  Final result Specimen:  Blood from Arm, Right Updated:  06/20/18 1120     Sodium 139 mmol/L      Potassium 3 6 mmol/L      Chloride 102 mmol/L      CO2 26 mmol/L      Anion Gap 11 mmol/L      BUN 23 mg/dL      Creatinine 0 95 mg/dL      Glucose 85 mg/dL      Calcium 8 8 mg/dL      AST 25 U/L      ALT 44 U/L      Alkaline Phosphatase 51 U/L      Total Protein 6 2 (L) g/dL      Albumin 2 8 (L) g/dL      Total Bilirubin 0 60 mg/dL      eGFR 74 ml/min/1 73sq m     Narrative: National Kidney Disease Education Program recommendations are as follows:  GFR calculation is accurate only with a steady state creatinine  Chronic Kidney disease less than 60 ml/min/1 73 sq  meters  Kidney failure less than 15 ml/min/1 73 sq  meters  Protime-INR [[id number]]  (Normal) Collected:  06/20/18 1039    Lab Status:  Final result Specimen:  Blood from Arm, Right Updated:  06/20/18 1115     Protime 10 8 seconds      INR 1 03    APTT [[id number]]  (Normal) Collected:  06/20/18 1039    Lab Status:  Final result Specimen:  Blood from Arm, Right Updated:  06/20/18 1115     PTT 25 seconds     CBC and differential [[id number]]  (Abnormal) Collected:  06/20/18 1039    Lab Status:  Final result Specimen:  Blood from Arm, Right Updated:  06/20/18 1055     WBC 9 18 Thousand/uL      RBC 4 79 Million/uL      Hemoglobin 14 7 g/dL      Hematocrit 47 3 %      MCV 99 (H) fL      MCH 30 7 pg      MCHC 31 1 (L) g/dL      RDW 15 9 (H) %      MPV 10 3 fL      Platelets 685 (L) Thousands/uL      nRBC 0 /100 WBCs      Neutrophils Relative 84 (H) %      Immat GRANS % 0 %      Lymphocytes Relative 8 (L) %      Monocytes Relative 7 %      Eosinophils Relative 1 %      Basophils Relative 0 %      Neutrophils Absolute 7 67 (H) Thousands/µL      Immature Grans Absolute 0 04 Thousand/uL      Lymphocytes Absolute 0 72 Thousands/µL      Monocytes Absolute 0 68 Thousand/µL      Eosinophils Absolute 0 06 Thousand/µL      Basophils Absolute 0 01 Thousands/µL                  XR spine thoracic 3 views   Final Result by Julián Veras MD (06/20 1967)      Acute T7 and chronic T11 and T12 compression fractures again identified  These are better visualized on CT of same day  Workstation performed: [id number]         CTA ED chest PE study   Final Result by Neva Carroll MD (06/20 1212)   1  No pulmonary emboli demonstrated  2   New nodular right middle lobe infiltrate measuring 1 4 x 1 7 cm, favoring an infectious/inflammatory etiology  Follow-up to resolution recommended  3   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  4   Stable aneurysmal ascending thoracic aorta measuring 4 4 x 4 5 cm    5   Stable hepatic cyst and bilateral renal cysts  Workstation performed: [id number]                    Procedures  ECG 12 Lead Documentation  Date/Time: 6/20/2018 2:46 PM  Performed by: Susanna Duran  Authorized by: Susanna Duran     Patient location:  ED  Rate:     ECG rate:  72    ECG rate assessment: normal    Rhythm:     Rhythm: sinus rhythm    Ectopy:     Ectopy: none    QRS:     QRS axis:  Normal    QRS intervals:  Normal  Conduction:     Conduction: normal    ST segments:     ST segments:  Normal  T waves:     T waves: non-specific             Phone Contacts  ED Phone Contact    ED Course                               MDM  Number of Diagnoses or Management Options  Diagnosis management comments: Pneumonia  Compression fracture    Patient states improvement in symptoms  He would like to go home  I will discharge patient to follow up with his family doctor and pulmonologist in 2 days    Return to ER symptoms worsens or progress       Amount and/or Complexity of Data Reviewed  Clinical lab tests: ordered and reviewed  Tests in the radiology section of CPT®: ordered and reviewed  Tests in the medicine section of CPT®: ordered and reviewed  Discuss the patient with other providers: yes    Risk of Complications, Morbidity, and/or Mortality  Presenting problems: high  Diagnostic procedures: high  Management options: high    Patient Progress  Patient progress: stable    CritCare Time    Disposition  Final diagnoses:   Pneumonia   Compression fracture of body of thoracic vertebra ([address] )     Time reflects when diagnosis was documented in both MDM as applicable and the Disposition within this note     Time User Action Codes Description Comment    6/20/2018  2:48 PM [facility] Add [J18 9] Pneumonia     6/20/2018 2:48 PM Christine Chua Add [[id number]] Compression fracture of body of thoracic vertebra [facility])       ED Disposition     ED Disposition Condition Comment    Discharge  [address] discharge to home/self care  Condition at discharge: Stable        Follow-up Information    None         Discharge Medication List as of 6/20/2018  2:56 PM      START taking these medications    Details   azithromycin (ZITHROMAX) 250 mg tablet Take 1 tablet (250 mg total) by mouth daily for 4 days, Starting Wed 6/20/2018, Until Sun 6/24/2018, Print      traMADol (ULTRAM) 50 mg tablet Take 1 tablet (50 mg total) by mouth every 6 (six) hours as needed for moderate pain for up to 10 days, Starting Wed 6/20/2018, Until Sat 6/30/2018, Print         CONTINUE these medications which have NOT CHANGED    Details   aspirin 81 mg chewable tablet Chew 1 tablet (81 mg total) daily, Starting Sat 6/2/2018, Print      atorvastatin (LIPITOR) 10 mg tablet Take 1 tablet by mouth daily, Historical Med      levothyroxine 75 mcg tablet Take 1 tablet by mouth daily, Historical Med      Multiple Vitamins-Minerals (ICAPS AREDS 2 PO) Apply 2 capsules to the mouth or throat 2 (two) times a day, Historical Med      mupirocin (BACTROBAN) 2 % ointment Apply topically 3 (three) times a day, Starting Fri 3/30/2018, Normal      pantoprazole (PROTONIX) 40 mg tablet Take 1 tablet (40 mg total) by mouth daily in the early morning, Starting Sat 6/2/2018, Normal      predniSONE 20 mg tablet 4 tabs po od, Normal           No discharge procedures on file      ED Provider  Electronically Signed by           Mira Myers MD  06/25/18 5429

## 2018-06-20 NOTE — DISCHARGE INSTRUCTIONS
Community Acquired Pneumonia   WHAT YOU NEED TO KNOW:   Community-acquired pneumonia (CAP) is a lung infection that you get outside of a hospital or nursing home setting  Your lungs become inflamed and cannot work well  CAP may be caused by bacteria, viruses, or fungi  DISCHARGE INSTRUCTIONS:   Return to the emergency department if:   · You are confused and cannot think clearly  · You have increased trouble breathing  · Your lips or fingernails turn gray or blue  Contact your healthcare provider if:   · Your symptoms do not get better, or they get worse  · You are urinating less, or not at all  · You have questions or concerns about your condition or care  Medicines:   · Medicines  may be given to treat a bacterial, viral, or fungal infection  You may also be given medicines to dilate your bronchial tubes to help you breathe more easily  · Take your medicine as directed  Contact your healthcare provider if you think your medicine is not helping or if you have side effects  Tell him or her if you are allergic to any medicine  Keep a list of the medicines, vitamins, and herbs you take  Include the amounts, and when and why you take them  Bring the list or the pill bottles to follow-up visits  Carry your medicine list with you in case of an emergency  Follow up with your healthcare provider within 3 days or as directed: You may need another x-ray  Write down your questions so you remember to ask them during your visits  Deep breathing and coughing:  Deep breathing helps open the air passages in your lungs  Coughing helps bring up mucus from your lungs  Take a deep breath and hold the breath as long as you can  Then push the air out of your lungs with a deep, strong cough  Spit out any mucus you have coughed up  Take 10 deep breaths in a row every hour that you are awake  Remember to follow each deep breath with a cough     Do not smoke or allow others to smoke around you:  Nicotine and other chemicals in cigarettes and cigars can cause lung damage  Ask your healthcare provider for information if you currently smoke and need help to quit  E-cigarettes or smokeless tobacco still contain nicotine  Talk to your healthcare provider before you use these products  Manage CAP at home:   · Breathe warm, moist air  This helps loosen mucus  Loosely place a warm, wet washcloth over your nose and mouth  A room humidifier may also help make the air moist     · Drink liquids as directed  Ask your healthcare provider how much liquid to drink each day and which liquids to drink  Liquids help make mucus thin and easier to get out of your body  · Gently tap your chest   This helps loosen mucus so it is easier to cough  Lie with your head lower than your chest several times a day and tap your chest      · Get plenty of rest   Rest helps your body heal   Prevent CAP:   · Wash your hands often with soap and water  Carry germ-killing hand gel with you  You can use the gel to clean your hands when soap and water are not available  Do not touch your eyes, nose, or mouth unless you have washed your hands first      · Clean surfaces often  Clean doorknobs, countertops, cell phones, and other surfaces that are touched often  · Always cover your mouth when you cough  Cough into a tissue or your shirtsleeve so you do not spread germs from your hands  · Try to avoid people who have a cold or the flu  If you are sick, stay away from others as much as possible  · Ask about vaccines  You may need a vaccine to help prevent pneumonia  Get an influenza (flu) vaccine every year as soon as it becomes available  © 2017 [address] Information is for End User's use only and may not be sold, redistributed or otherwise used for commercial purposes  All illustrations and images included in CareNotes® are the copyrighted property of [employer]  or Joel Mayer    The above information is an  only  It is not intended as medical advice for individual conditions or treatments  Talk to your doctor, nurse or pharmacist before following any medical regimen to see if it is safe and effective for you  Vertebral Compression Fracture   WHAT YOU NEED TO KNOW:   A vertebral compression fracture (VCF) is a break in a part of the vertebra  Vertebrae are the round, strong bones that form your spine  VCFs most often occur in the thoracic (middle) and lumbar (lower) areas of your spine  Fractures may be mild to severe  DISCHARGE INSTRUCTIONS:   Medicines: You may need any of the following:  · NSAIDs , such as ibuprofen, help decrease swelling, pain, and fever  This medicine is available with or without a doctor's order  NSAIDs can cause stomach bleeding or kidney problems in certain people  If you take blood thinner medicine, always ask if NSAIDs are safe for you  Always read the medicine label and follow directions  Do not give these medicines to children under 10months of age without direction from your child's healthcare provider  · Acetaminophen  decreases pain and fever  It is available without a doctor's order  Ask how much to take and how often to take it  Follow directions  Acetaminophen can cause liver damage if not taken correctly  · Prescription pain medicine  may be given  Ask your healthcare provider how to take this medicine safely  · Bisphosphonates and calcitonin  may be recommended to help your bones get stronger  They can decrease the pain of a VCF caused by osteoporosis, and decrease your risk for another fracture  · Take your medicine as directed  Contact your healthcare provider if you think your medicine is not helping or if you have side effects  Tell him or her if you are allergic to any medicine  Keep a list of the medicines, vitamins, and herbs you take  Include the amounts, and when and why you take them   Bring the list or the pill bottles to follow-up visits  Carry your medicine list with you in case of an emergency  Follow up with your healthcare provider as directed: You may need to return for x-rays or other tests  Write down your questions so you remember to ask them during your visits  Heat and ice:   · Apply ice  on your back for 15 to 20 minutes every hour or as directed  Use an ice pack, or put crushed ice in a plastic bag  Cover it with a towel  Ice helps prevent tissue damage and decreases swelling and pain  · Apply heat  on your back for 20 to 30 minutes every 2 hours for as many days as directed  Heat helps decrease pain and muscle spasms  Activity:   · Avoid activities that may make the pain worse, such as picking up heavy objects  When the pain decreases, begin normal, slow movements as directed by your healthcare provider  Your healthcare provider may have you do weight-bearing exercises such as walking  You may also do non-weight-bearing exercises such as swimming and bicycling  · You may need to use a walker or cane  Ask your healthcare provider for more information about how to use a cane or a walker  · When you  objects, bend at the hips and knees  Never bend from the waist only  Use bent knees and your leg muscles as you lift the object  While you lift the object, keep it close to your chest  Try not to twist or lift anything above your waist   Physical and occupational therapy:  Your healthcare provider may recommend physical and occupational therapy  A physical therapist teaches you exercises to help improve movement and strength, and to decrease pain  An occupational therapist teaches you skills to help with your daily activities  Manage pain during sleep:   · Do not sleep on a waterbed  Waterbeds do not provide good back support  · Sleep on a firm mattress  You may also put a ½ to 1-inch piece of plywood between the mattress and box spring  · Sleep on your back with a pillow under your knees   This will decrease pressure on your back  You may also sleep on your side with 1 or both of your knees bent and a pillow between them  It may also be helpful to sleep on your stomach with a pillow under you at waist level  Contact your healthcare provider if:   · You are not hungry, and you are losing weight  · You cannot sleep or rest because of back pain  · You have pain or swelling in your back that is getting worse, or does not go away  · You have questions or concerns about your condition or care  Seek care immediately or call 911 if:   · You feel lightheaded, short of breath, and have chest pain  · You cough up blood  · Your arm or leg feels warm, tender, and painful  It may look swollen and red  · You have new problems urinating or having bowel movements  · You have severe pain in your back after falling, bending forward, sneezing, or coughing strongly  · You suddenly cannot feel your legs  · You suddenly have trouble moving your arms or legs  © 2017 [facility] Information is for End User's use only and may not be sold, redistributed or otherwise used for commercial purposes  All illustrations and images included in CareNotes® are the copyrighted property of [employer]  or Joel Mayer  The above information is an  only  It is not intended as medical advice for individual conditions or treatments  Talk to your doctor, nurse or pharmacist before following any medical regimen to see if it is safe and effective for you

## 2018-06-20 NOTE — ED NOTES
Per Dr Fletcher Alonzo, pt can eat  Pt given lunchbox        [address], RN  06/20/18 8994

## 2018-06-20 NOTE — ASSESSMENT & PLAN NOTE
Patient has acute severe right upper back pain  It is reproducible on palpation and he is significant impairment with his breathing due to this  He did try muscle relaxant at home as well as OTC tylenol without any relief  Given chronic steroid use and lack of response to the medications, I recommend he go to the ER to be evaluated/imaged if needed  Patient is agreeable to this at this time

## 2018-06-20 NOTE — ASSESSMENT & PLAN NOTE
This is as assessed on CT chest performed in prior hospitalization  Also patient had chronic bronchitis  This is likely secondary to smoking as well  I doubt that the budesonide is causing his back symptoms  If anything the oral prednisone is a big culprit for muscle weakness and fractures  However at this time I do not feel he will require the budesonide further  He is asked to continue to use Duoneb but on a regular basis twice daily

## 2018-06-21 ENCOUNTER — TELEPHONE (OUTPATIENT)
Dept: PULMONOLOGY | Facility: MEDICAL CENTER | Age: 83
End: 2018-06-21

## 2018-06-21 LAB
ATRIAL RATE: 72 BPM
P AXIS: 36 DEGREES
PR INTERVAL: 138 MS
QRS AXIS: -10 DEGREES
QRSD INTERVAL: 86 MS
QT INTERVAL: 382 MS
QTC INTERVAL: 418 MS
T WAVE AXIS: 66 DEGREES
VENTRICULAR RATE: 72 BPM

## 2018-06-21 PROCEDURE — 93010 ELECTROCARDIOGRAM REPORT: CPT | Performed by: INTERNAL MEDICINE

## 2018-06-21 NOTE — TELEPHONE ENCOUNTER
Called patient to follow-up post discharge from ER yesterday  He feels better today due to better pain control  He has an acute T7 fracture  Unsure about bronchoscopy so soon due to acuity of thoracic fracture  Will reach out to his PCP and discuss  He is now able to expectorate better  He is using Duoneb twice daily  He did stop the Budesonide  He had a CTA which is reviewed  Infiltrate is likely inflammatory but he is on azithromycin per ER discharge

## 2018-06-25 ENCOUNTER — OFFICE VISIT (OUTPATIENT)
Dept: OBGYN CLINIC | Facility: HOSPITAL | Age: 83
End: 2018-06-25
Payer: COMMERCIAL

## 2018-06-25 VITALS
WEIGHT: 160 LBS | BODY MASS INDEX: 25.71 KG/M2 | DIASTOLIC BLOOD PRESSURE: 95 MMHG | HEART RATE: 77 BPM | SYSTOLIC BLOOD PRESSURE: 143 MMHG | HEIGHT: 66 IN

## 2018-06-25 DIAGNOSIS — M54.6 ACUTE RIGHT-SIDED THORACIC BACK PAIN: ICD-10-CM

## 2018-06-25 DIAGNOSIS — M48.54XA COMPRESSION FRACTURE OF THORACIC SPINE, NON-TRAUMATIC, INITIAL ENCOUNTER (HCC): Primary | ICD-10-CM

## 2018-06-25 LAB
BACTERIA BLD CULT: NORMAL
BACTERIA BLD CULT: NORMAL

## 2018-06-25 PROCEDURE — 99203 OFFICE O/P NEW LOW 30 MIN: CPT | Performed by: ORTHOPAEDIC SURGERY

## 2018-06-25 NOTE — ASSESSMENT & PLAN NOTE
Patient presents for evaluation of acute onset thoracic back pain  He has been diagnosed with an acute T7 compression fracture  He currently takes tramadol and this provides improvement in pain symptoms  Pain is localized to the thoracic spine bilaterally  Denies any gricel weakness or radiation into his extremities    On physical exam he is tender to palpation over the thoracic spine  He is neurologically intact C5 through T1 and L2-S1  He does ambulate independently    Imaging demonstrates an acute T7 compression deformity    I recommend TLSO brace and avoidance of strenuous activities around the house  Continue with tramadol as needed  Follow-up in 4 weeks for re-evaluation and new x-rays

## 2018-06-25 NOTE — PROGRESS NOTES
Assessment/Plan:    Acute right-sided thoracic back pain  Patient presents for evaluation of acute onset thoracic back pain  He has been diagnosed with an acute T7 compression fracture  He currently takes tramadol and this provides improvement in pain symptoms  Pain is localized to the thoracic spine bilaterally  Denies any gricel weakness or radiation into his extremities    On physical exam he is tender to palpation over the thoracic spine  He is neurologically intact C5 through T1 and L2-S1  He does ambulate independently    Imaging demonstrates an acute T7 compression deformity    I recommend TLSO brace and avoidance of strenuous activities around the house  Continue with tramadol as needed  Follow-up in 4 weeks for re-evaluation and new x-rays  Problem List Items Addressed This Visit     Acute right-sided thoracic back pain     Patient presents for evaluation of acute onset thoracic back pain  He has been diagnosed with an acute T7 compression fracture  He currently takes tramadol and this provides improvement in pain symptoms  Pain is localized to the thoracic spine bilaterally  Denies any gricel weakness or radiation into his extremities    On physical exam he is tender to palpation over the thoracic spine  He is neurologically intact C5 through T1 and L2-S1  He does ambulate independently    Imaging demonstrates an acute T7 compression deformity    I recommend TLSO brace and avoidance of strenuous activities around the house  Continue with tramadol as needed  Follow-up in 4 weeks for re-evaluation and new x-rays  Other Visit Diagnoses     Compression fracture of thoracic spine, non-traumatic, initial encounter [facility])    -  Primary            Subjective:      Patient ID: Bentley Sotomayor is a 81 y o  male  HPI  Patient presents to the office for middle back pain ongoing for roughly 3 weeks   His pain was initially in between his shoulder blades, but is now slightly higher on bilateral sides of his spine  He does not recall any injury or trauma  He does recall being on prednisone a week prior to onset of his pain  He has increased pain with sitting due to the pressure from a chair  He notes decrease in endurance  Patient notes 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  He continues to garden  The following portions of the patient's history were reviewed and updated as appropriate: current medications, past family history, past medical history, past social history, past surgical history and problem list     Review of Systems   Constitutional: Negative  HENT: Negative  Eyes: Negative  Respiratory: Negative  Cardiovascular: Negative  Musculoskeletal: Positive for back pain  Neurological: Positive for numbness  Psychiatric/Behavioral: Negative  Objective:      /95   Pulse 77   Ht 5' 6" (1 676 m)   Wt 72 6 kg (160 lb)   BMI 25 82 kg/m²          Physical Exam   Constitutional: He is oriented to person, place, and time  He appears well-developed and well-nourished  HENT:   Head: Normocephalic and atraumatic  Eyes: Pupils are equal, round, and reactive to light  Cardiovascular: Intact distal pulses  Pulmonary/Chest: Breath sounds normal    Neurological: He is alert and oriented to person, place, and time  Skin: Skin is warm and dry  Psychiatric: He has a normal mood and affect   His behavior is normal        Patient ambulates without assistance  Tender to palpation thoracolumbar paraspinal muscles  Strength 5/5 L2-S1 bilaterally

## 2018-06-28 ENCOUNTER — TELEPHONE (OUTPATIENT)
Dept: OTHER | Facility: HOSPITAL | Age: 83
End: 2018-06-28

## 2018-06-28 ENCOUNTER — TELEPHONE (OUTPATIENT)
Dept: PULMONOLOGY | Facility: MEDICAL CENTER | Age: 83
End: 2018-06-28

## 2018-06-28 DIAGNOSIS — S22.000A COMPRESSION FRACTURE OF BODY OF THORACIC VERTEBRA (HCC): ICD-10-CM

## 2018-06-28 DIAGNOSIS — R05.9 COUGH: ICD-10-CM

## 2018-06-28 RX ORDER — PANTOPRAZOLE SODIUM 40 MG/1
40 TABLET, DELAYED RELEASE ORAL
Qty: 30 TABLET | Refills: 3 | Status: SHIPPED | OUTPATIENT
Start: 2018-06-28 | End: 2018-10-09

## 2018-06-28 RX ORDER — TRAMADOL HYDROCHLORIDE 50 MG/1
50 TABLET ORAL EVERY 6 HOURS PRN
Qty: 30 TABLET | Refills: 0 | Status: SHIPPED | OUTPATIENT
Start: 2018-06-28 | End: 2018-07-08

## 2018-06-28 NOTE — TELEPHONE ENCOUNTER
The patient would like to talk to you regarding his prescriptions that were given when he was discharged from the hospital

## 2018-06-29 ENCOUNTER — VBI (OUTPATIENT)
Dept: FAMILY MEDICINE CLINIC | Facility: CLINIC | Age: 83
End: 2018-06-29

## 2018-06-29 NOTE — TELEPHONE ENCOUNTER
Pt was seen in [place] ed on 6/20/18  CC: back Pain  DX: Pneumonia; compression fracture of body of thoracic vertebra   Left message, pt is aware of office hours and phone number

## 2018-07-12 LAB
25(OH)D3 SERPL-MCNC: 27 NG/ML (ref 30–100)
BASOPHILS # BLD AUTO: 17 CELLS/UL (ref 0–200)
BASOPHILS NFR BLD AUTO: 0.2 %
BUN SERPL-MCNC: 21 MG/DL (ref 7–25)
BUN/CREAT SERPL: NORMAL (CALC) (ref 6–22)
CA-I SERPL-MCNC: 5 MG/DL (ref 4.8–5.6)
CALCIUM SERPL-MCNC: 9 MG/DL (ref 8.6–10.3)
CALCIUM SERPL-MCNC: 9 MG/DL (ref 8.6–10.3)
CHLORIDE SERPL-SCNC: 102 MMOL/L (ref 98–110)
CO2 SERPL-SCNC: 28 MMOL/L (ref 20–31)
CREAT SERPL-MCNC: 0.92 MG/DL (ref 0.7–1.11)
CRP SERPL-MCNC: 26.9 MG/L
EOSINOPHIL # BLD AUTO: 122 CELLS/UL (ref 15–500)
EOSINOPHIL NFR BLD AUTO: 1.4 %
ERYTHROCYTE [DISTWIDTH] IN BLOOD BY AUTOMATED COUNT: 14.3 % (ref 11–15)
ERYTHROCYTE [SEDIMENTATION RATE] IN BLOOD BY WESTERGREN METHOD: 2 MM/H
GLUCOSE SERPL-MCNC: 71 MG/DL (ref 65–139)
HCT VFR BLD AUTO: 44.9 % (ref 38.5–50)
HGB BLD-MCNC: 15.1 G/DL (ref 13.2–17.1)
LYMPHOCYTES # BLD AUTO: 948 CELLS/UL (ref 850–3900)
LYMPHOCYTES NFR BLD AUTO: 10.9 %
MCH RBC QN AUTO: 31.2 PG (ref 27–33)
MCHC RBC AUTO-ENTMCNC: 33.6 G/DL (ref 32–36)
MCV RBC AUTO: 92.8 FL (ref 80–100)
MONOCYTES # BLD AUTO: 809 CELLS/UL (ref 200–950)
MONOCYTES NFR BLD AUTO: 9.3 %
NEUTROPHILS # BLD AUTO: 6803 CELLS/UL (ref 1500–7800)
NEUTROPHILS NFR BLD AUTO: 78.2 %
PLATELET # BLD AUTO: 131 THOUSAND/UL (ref 140–400)
PMV BLD REES-ECKER: 10.6 FL (ref 7.5–12.5)
POTASSIUM SERPL-SCNC: 3.8 MMOL/L (ref 3.5–5.3)
PTH-INTACT SERPL-MCNC: 73 PG/ML (ref 14–64)
RBC # BLD AUTO: 4.84 MILLION/UL (ref 4.2–5.8)
SL AMB EGFR AFRICAN AMERICAN: 89 ML/MIN/1.73M2
SL AMB EGFR NON AFRICAN AMERICAN: 77 ML/MIN/1.73M2
SODIUM SERPL-SCNC: 139 MMOL/L (ref 135–146)
WBC # BLD AUTO: 8.7 THOUSAND/UL (ref 3.8–10.8)

## 2018-07-30 ENCOUNTER — OFFICE VISIT (OUTPATIENT)
Dept: OBGYN CLINIC | Facility: HOSPITAL | Age: 83
End: 2018-07-30

## 2018-07-30 ENCOUNTER — HOSPITAL ENCOUNTER (OUTPATIENT)
Dept: RADIOLOGY | Facility: HOSPITAL | Age: 83
Discharge: HOME | End: 2018-07-30
Attending: ORTHOPAEDIC SURGERY
Payer: COMMERCIAL

## 2018-07-30 VITALS
HEART RATE: 71 BPM | WEIGHT: 160 LBS | HEIGHT: 66 IN | SYSTOLIC BLOOD PRESSURE: 164 MMHG | BODY MASS INDEX: 25.71 KG/M2 | DIASTOLIC BLOOD PRESSURE: 104 MMHG

## 2018-07-30 DIAGNOSIS — M48.54XA COMPRESSION FRACTURE OF THORACIC SPINE, NON-TRAUMATIC, INITIAL ENCOUNTER (HCC): ICD-10-CM

## 2018-07-30 DIAGNOSIS — Z09 FRACTURE FOLLOW-UP: Primary | ICD-10-CM

## 2018-07-30 DIAGNOSIS — Z09 FRACTURE FOLLOW-UP: ICD-10-CM

## 2018-07-30 PROCEDURE — 72070 X-RAY EXAM THORAC SPINE 2VWS: CPT

## 2018-07-30 PROCEDURE — 99024 POSTOP FOLLOW-UP VISIT: CPT | Performed by: ORTHOPAEDIC SURGERY

## 2018-07-30 NOTE — PROGRESS NOTES
Assessment/Plan:    Compression fracture of thoracic spine, non-traumatic, initial encounter [facility])  Patient presents for follow-up regarding thoracic compression fracture being treated nonsurgically with bracing  She reports significant improvement in acute pain to his fracture site  He reports ongoing chronic bilateral spinal muscle pains for which she sees a chiropractor  He denies any specific radiation to his legs  X-ray today demonstrates compression deformity in the mid thoracic spine  This is roughly unchanged  Assessment and plan  Continue TLSO brace for the next 4 weeks  Avoid strenuous activity including heavy lifting  Follow-up p r n  Problem List Items Addressed This Visit     Compression fracture of thoracic spine, non-traumatic, initial encounter [facility])     Patient presents for follow-up regarding thoracic compression fracture being treated nonsurgically with bracing  She reports significant improvement in acute pain to his fracture site  He reports ongoing chronic bilateral spinal muscle pains for which she sees a chiropractor  He denies any specific radiation to his legs  X-ray today demonstrates compression deformity in the mid thoracic spine  This is roughly unchanged  Assessment and plan  Continue TLSO brace for the next 4 weeks  Avoid strenuous activity including heavy lifting  Follow-up p r n  Other Visit Diagnoses     Fracture follow-up    -  Primary    Relevant Orders    XR spine thoracic 2 vw            Subjective:      Patient ID: Ryan Kurtz is a 81 y o  male  HPI   Patient is here for follow for compression T7  Fracture  Patient is wearing TLSO brace  Patient states today pain is better and  he is able to to move around a little bit better  The following portions of the patient's history were reviewed and updated as appropriate: allergies and current medications  Review of Systems   Constitutional: Negative  HENT: Negative  Eyes: Negative  Respiratory: Negative  Cardiovascular: Negative  Gastrointestinal: Negative  Endocrine: Negative  Musculoskeletal: Positive for back pain  Skin: Negative  Neurological: Negative  Psychiatric/Behavioral: Negative  Objective:      BP (!) 164/104   Pulse 71   Ht 5' 6" (1 676 m)   Wt 72 6 kg (160 lb)   BMI 25 82 kg/m²          Physical Exam   Constitutional: He is oriented to person, place, and time  He appears well-developed and well-nourished  HENT:   Right Ear: External ear normal    Left Ear: External ear normal    Eyes: Pupils are equal, round, and reactive to light  Neck: Normal range of motion  Cardiovascular: Normal rate and regular rhythm  Pulmonary/Chest: Effort normal and breath sounds normal    Neurological: He is alert and oriented to person, place, and time  Skin: Skin is warm and dry  Psychiatric: He has a normal mood and affect  His behavior is normal  Thought content normal          On physical exam, no tenderness over the fracture site  Negative straight leg raise    Bilaterally  L2-S1 sensation intact  Bilaterally  L2-S1 strengthen  5/5  Patient is ambulates independently     Thoracic xrays impression:  Compression deformity mid thoracic spine   Other age indeterminate thoracolumbar compression deformities

## 2018-07-30 NOTE — ASSESSMENT & PLAN NOTE
Patient presents for follow-up regarding thoracic compression fracture being treated nonsurgically with bracing  She reports significant improvement in acute pain to his fracture site  He reports ongoing chronic bilateral spinal muscle pains for which she sees a chiropractor  He denies any specific radiation to his legs  X-ray today demonstrates compression deformity in the mid thoracic spine  This is roughly unchanged  Assessment and plan  Continue TLSO brace for the next 4 weeks  Avoid strenuous activity including heavy lifting  Follow-up p r n

## 2018-08-10 ENCOUNTER — TELEPHONE (OUTPATIENT)
Dept: FAMILY MEDICINE CLINIC | Facility: CLINIC | Age: 83
End: 2018-08-10

## 2018-08-14 PROBLEM — Z93.0 TRACHEOSTOMY IN PLACE (HCC): Status: ACTIVE | Noted: 2018-08-14

## 2018-08-30 ENCOUNTER — OFFICE VISIT (OUTPATIENT)
Dept: PULMONOLOGY | Facility: MEDICAL CENTER | Age: 83
End: 2018-08-30
Payer: COMMERCIAL

## 2018-08-30 VITALS
SYSTOLIC BLOOD PRESSURE: 140 MMHG | RESPIRATION RATE: 16 BRPM | HEIGHT: 68 IN | DIASTOLIC BLOOD PRESSURE: 92 MMHG | BODY MASS INDEX: 23.95 KG/M2 | OXYGEN SATURATION: 95 % | TEMPERATURE: 98.1 F | HEART RATE: 66 BPM | WEIGHT: 158 LBS

## 2018-08-30 DIAGNOSIS — Z93.0 TRACHEOSTOMY IN PLACE (HCC): ICD-10-CM

## 2018-08-30 DIAGNOSIS — R91.8 LUNG INFILTRATE ON CT: Primary | ICD-10-CM

## 2018-08-30 DIAGNOSIS — J43.2 CENTRILOBULAR EMPHYSEMA (HCC): ICD-10-CM

## 2018-08-30 PROCEDURE — 99214 OFFICE O/P EST MOD 30 MIN: CPT | Performed by: INTERNAL MEDICINE

## 2018-08-30 RX ORDER — METHYLPREDNISOLONE 4 MG/1
8 TABLET ORAL
Status: ON HOLD | COMMUNITY
End: 2019-05-06 | Stop reason: DRUGHIGH

## 2018-08-30 RX ORDER — MAG HYDROX/ALUMINUM HYD/SIMETH 400-400-40
5000 SUSPENSION, ORAL (FINAL DOSE FORM) ORAL DAILY
COMMUNITY

## 2018-08-30 NOTE — LETTER
August 30, 2018     Lei [place], [place]    Patient: Ryan Kurtz   YOB: 1935   Date of Visit: 8/30/2018       Dear Dr Steffanie Guevara: Thank you for referring Shilpa Haider to me for evaluation  Below are my notes for this consultation  If you have questions, please do not hesitate to call me  I look forward to following your patient along with you  Sincerely,        Adwoa Eavngelista MD        CC: No Recipients  Adwoa Evangelista MD  8/30/2018 12:39 PM  Sign at close encounter  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  Will hold off on any other inhaler therapy at this time  Patient does have a chronic cough with mucus production  He has not undergone recent evaluation of the larynx or bronchial tree and given shortness of breath and mucus production he may benefit from surveillance bronchoscopy  Will obtain CT chest initially to assess resolution of his nodular infiltrate of the right middle lobe and then schedule this procedure  Relevant Medications    methylprednisolone (MEDROL) 4 mg tablet    Lung infiltrate on CT - Primary     CT chest is ordered to assess resolution of lung infiltrate  Relevant Orders    CT chest without contrast       Other    Tracheostomy in place [facility])              All questions are answered to the patient's satisfaction and understanding  He verbalizes understanding  He is encouraged to call with any further questions or concerns      Portions of the record may have been created with voice recognition software  Occasional wrong word or "sound a like" substitutions may have occurred due to the inherent limitations of voice recognition software  Read the chart carefully and recognize, using context, where substitutions have occurred  Electronically Signed by Suzie Reed MD    ______________________________________________________________________    Chief Complaint:   Chief Complaint   Patient presents with    Shortness of Breath     exertion       Patient ID: Bennie Skaggs is a 81 y o  y o  male has a past medical history of Arthritis; Benign essential hypertension; Cancer ([address] ); Chronic laryngitis; Chronic obstructive lung disease ([address] ); Chronic rhinitis; Cough; Disease of thyroid gland; Emphysema lung ([address] ); Heart murmur; Hypothyroidism; Inguinal hernia; Mixed hyperlipidemia; SOB (shortness of breath); Temporal arteritis ([address] ); Throat cancer ([address] ); and Thyroid disease  8/30/2018  Patient presents for follow-up visit  He has shortness of breath  This is with any kind of exertion  This started around March or April  He feels tired  So he keeps a lawn chair where he is working  There are times when he is working and he is ok and this may be related to position  Yesterday he was shoveling dirt- it is a light shovel  He did that for about 30 min  Cough is loose  Does not notice difference with nebulizer and he has not used it for about 4 weeks  He is switched to methylprednisolone from prednisone  Unsure of how long he is going to be on steroids  Back pain is improving  He uses a brace  He is not doing physical therapy  He sleeps well at night  Feeling a burning sensation most recently in his chest  He has suspected GERD however is taking his medication midday  Shortness of Breath   This is a chronic problem  The current episode started more than 1 month ago  The problem occurs intermittently  The problem has been unchanged   Associated symptoms include sputum production  Pertinent negatives include no abdominal pain, fever, hemoptysis, leg swelling, orthopnea, PND, sore throat, swollen glands or wheezing  The symptoms are aggravated by exercise  He has tried nothing for the symptoms  His past medical history is significant for COPD  Review of Systems   Constitutional: Positive for fatigue  Negative for fever  HENT: Negative for sore throat  Eyes: Negative for visual disturbance  Respiratory: Positive for sputum production and shortness of breath  Negative for hemoptysis and wheezing  Cardiovascular: Negative for orthopnea, leg swelling and PND  Gastrointestinal: Negative for abdominal pain  Endocrine: Negative for polyuria  Genitourinary: Negative for difficulty urinating  Musculoskeletal: Positive for back pain  Skin: Negative for color change and pallor  Allergic/Immunologic: Positive for immunocompromised state (chronic steroids)  Neurological: Negative for dizziness and light-headedness  Hematological: Negative for adenopathy  Psychiatric/Behavioral: Negative for agitation and confusion  Smoking history: He reports that he quit smoking about 9 years ago  He has a 60 00 pack-year smoking history  He has never used smokeless tobacco       There is no immunization history for the selected administration types on file for this patient    Current Outpatient Prescriptions   Medication Sig Dispense Refill    atorvastatin (LIPITOR) 10 mg tablet Take 1 tablet by mouth daily      Cholecalciferol (VITAMIN D3) 5000 units CAPS Take by mouth      levothyroxine 75 mcg tablet Take 1 tablet by mouth daily      methylprednisolone (MEDROL) 4 mg tablet Take 4 mg by mouth 4 (four) times a day      Multiple Vitamin (ONE-A-DAY MENS PO) Take by mouth      Multiple Vitamins-Minerals (ICAPS AREDS 2 PO) Apply 2 capsules to the mouth or throat 2 (two) times a day      mupirocin (BACTROBAN) 2 % ointment Apply topically 3 (three) times a day 22 g 5    pantoprazole (PROTONIX) 40 mg tablet Take 1 tablet (40 mg total) by mouth daily in the early morning 30 tablet 3     No current facility-administered medications for this visit  Allergies: Cefdinir and Other    Objective:  Vitals:    08/30/18 1010   BP: 140/92   BP Location: Right arm   Patient Position: Sitting   Cuff Size: Standard   Pulse: 66   Resp: 16   Temp: 98 1 °F (36 7 °C)   TempSrc: Tympanic   SpO2: 95%   Weight: 71 7 kg (158 lb)   Height: 5' 8" (1 727 m)   Oxygen Therapy  SpO2: 95 %    Wt Readings from Last 3 Encounters:   08/30/18 71 7 kg (158 lb)   07/30/18 72 6 kg (160 lb)   06/25/18 72 6 kg (160 lb)     Body mass index is 24 02 kg/m²  Physical Exam   Constitutional: He is oriented to person, place, and time  He appears well-nourished  HENT:   Head: Atraumatic  Neck: Normal range of motion  Neck supple  Trach in place   Cardiovascular: Normal rate and regular rhythm  Pulmonary/Chest: Effort normal    Bibasilar rhonchi appreciated   Abdominal: Soft  Bowel sounds are normal    Musculoskeletal: Normal range of motion  He exhibits no edema  Neurological: He is alert and oriented to person, place, and time  Skin: Skin is warm and dry  Psychiatric: He has a normal mood and affect  His behavior is normal    Vitals reviewed        Lab Review: reviewed  Orders Only on 07/11/2018   Component Date Value    PTH, Intact 07/11/2018 73*    SL AMB CALCIUM 07/11/2018 9 0     Glucose 07/11/2018 71     BUN 07/11/2018 21     Creatinine 07/11/2018 0 92     eGFR Non African American 07/11/2018 77     SL AMB EGFR  AMER* 07/11/2018 89     SL AMB BUN/CREATININE RA* 04/38/3163 NOT APPLICABLE     Sodium 14/05/5963 139     SL AMB POTASSIUM 07/11/2018 3 8     Chloride 07/11/2018 102     SL AMB CARBON DIOXIDE 07/11/2018 28     SL AMB CALCIUM 07/11/2018 9 0     Calcium, Ionized 07/11/2018 5 0     SL AMB SED RATE BY MODIF* 07/11/2018 2     White Blood Cell Count 07/11/2018 8 7     Red Blood Cell Count 07/11/2018 4 84     Hemoglobin 07/11/2018 15 1     HCT 07/11/2018 44 9     MCV 07/11/2018 92 8     MCH 07/11/2018 31 2     MCHC 07/11/2018 33 6     RDW 07/11/2018 14 3     Platelet Count 50/39/9967 131*    SL AMB MPV 07/11/2018 10 6     Neutrophils (Absolute) 07/11/2018 6803     Lymphocytes (Absolute) 07/11/2018 948     Monocytes (Absolute) 07/11/2018 809     Eosinophils (Absolute) 07/11/2018 122     Basophils (Absolute) 07/11/2018 17     Neutrophils 07/11/2018 78 2     Lymphocytes 07/11/2018 10 9     Monocytes 07/11/2018 9 3     Eosinophils 07/11/2018 1 4     Basophils Relative 07/11/2018 0 2     C-Reactive Protein, Quant 07/11/2018 26 9*    Vitamin D, 25-Hydroxy, S* 07/11/2018 27*       Diagnostics:  No new chest imaging

## 2018-08-30 NOTE — PROGRESS NOTES
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  Will hold off on any other inhaler therapy at this time  Patient does have a chronic cough with mucus production  He has not undergone recent evaluation of the larynx or bronchial tree and given shortness of breath and mucus production he may benefit from surveillance bronchoscopy  Will obtain CT chest initially to assess resolution of his nodular infiltrate of the right middle lobe and then schedule this procedure  Relevant Medications    methylprednisolone (MEDROL) 4 mg tablet    Lung infiltrate on CT - Primary     CT chest is ordered to assess resolution of lung infiltrate  Relevant Orders    CT chest without contrast       Other    Tracheostomy in place [facility])              All questions are answered to the patient's satisfaction and understanding  He verbalizes understanding  He is encouraged to call with any further questions or concerns  Portions of the record may have been created with voice recognition software  Occasional wrong word or "sound a like" substitutions may have occurred due to the inherent limitations of voice recognition software  Read the chart carefully and recognize, using context, where substitutions have occurred      Electronically Signed by Jose Enrique Eden MD    ______________________________________________________________________    Chief Complaint:   Chief Complaint   Patient presents with    Shortness of Breath     exertion       Patient ID: Patricio Pittman is a 81 y o  y o  male has a past medical history of Arthritis; Benign essential hypertension; Cancer ([facility] [address] ); Chronic laryngitis; Chronic obstructive lung disease ([address] ); Chronic rhinitis; Cough; Disease of thyroid gland; Emphysema lung ([address] ); Heart murmur; Hypothyroidism; Inguinal hernia; Mixed hyperlipidemia; SOB (shortness of breath); Temporal arteritis ([address] ); Throat cancer ([facility] [address] ); and Thyroid disease  8/30/2018  Patient presents for follow-up visit  He has shortness of breath  This is with any kind of exertion  This started around March or April  He feels tired  So he keeps a lawn chair where he is working  There are times when he is working and he is ok and this may be related to position  Yesterday he was shoveling dirt- it is a light shovel  He did that for about 30 min  Cough is loose  Does not notice difference with nebulizer and he has not used it for about 4 weeks  He is switched to methylprednisolone from prednisone  Unsure of how long he is going to be on steroids  Back pain is improving  He uses a brace  He is not doing physical therapy  He sleeps well at night  Feeling a burning sensation most recently in his chest  He has suspected GERD however is taking his medication midday  Shortness of Breath   This is a chronic problem  The current episode started more than 1 month ago  The problem occurs intermittently  The problem has been unchanged  Associated symptoms include sputum production  Pertinent negatives include no abdominal pain, fever, hemoptysis, leg swelling, orthopnea, PND, sore throat, swollen glands or wheezing  The symptoms are aggravated by exercise  He has tried nothing for the symptoms  His past medical history is significant for COPD  Review of Systems   Constitutional: Positive for fatigue  Negative for fever  HENT: Negative for sore throat  Eyes: Negative for visual disturbance  Respiratory: Positive for sputum production and shortness of breath  Negative for hemoptysis and wheezing  Cardiovascular: Negative for orthopnea, leg swelling and PND  Gastrointestinal: Negative for abdominal pain  Endocrine: Negative for polyuria  Genitourinary: Negative for difficulty urinating  Musculoskeletal: Positive for back pain  Skin: Negative for color change and pallor  Allergic/Immunologic: Positive for immunocompromised state (chronic steroids)  Neurological: Negative for dizziness and light-headedness  Hematological: Negative for adenopathy  Psychiatric/Behavioral: Negative for agitation and confusion  Smoking history: He reports that he quit smoking about 9 years ago  He has a 60 00 pack-year smoking history  He has never used smokeless tobacco       There is no immunization history for the selected administration types on file for this patient  Current Outpatient Prescriptions   Medication Sig Dispense Refill    atorvastatin (LIPITOR) 10 mg tablet Take 1 tablet by mouth daily      Cholecalciferol (VITAMIN D3) 5000 units CAPS Take by mouth      levothyroxine 75 mcg tablet Take 1 tablet by mouth daily      methylprednisolone (MEDROL) 4 mg tablet Take 4 mg by mouth 4 (four) times a day      Multiple Vitamin (ONE-A-DAY MENS PO) Take by mouth      Multiple Vitamins-Minerals (ICAPS AREDS 2 PO) Apply 2 capsules to the mouth or throat 2 (two) times a day      mupirocin (BACTROBAN) 2 % ointment Apply topically 3 (three) times a day 22 g 5    pantoprazole (PROTONIX) 40 mg tablet Take 1 tablet (40 mg total) by mouth daily in the early morning 30 tablet 3     No current facility-administered medications for this visit  Allergies: Cefdinir and Other    Objective:  Vitals:    08/30/18 1010   BP: 140/92   BP Location: Right arm   Patient Position: Sitting   Cuff Size: Standard   Pulse: 66   Resp: 16   Temp: 98 1 °F (36 7 °C)   TempSrc: Tympanic   SpO2: 95%   Weight: 71 7 kg (158 lb)   Height: 5' 8" (1 727 m)   Oxygen Therapy  SpO2: 95 %      Wt Readings from Last 3 Encounters: 08/30/18 71 7 kg (158 lb)   07/30/18 72 6 kg (160 lb)   06/25/18 72 6 kg (160 lb)     Body mass index is 24 02 kg/m²  Physical Exam   Constitutional: He is oriented to person, place, and time  He appears well-nourished  HENT:   Head: Atraumatic  Neck: Normal range of motion  Neck supple  Trach in place   Cardiovascular: Normal rate and regular rhythm  Pulmonary/Chest: Effort normal    Bibasilar rhonchi appreciated   Abdominal: Soft  Bowel sounds are normal    Musculoskeletal: Normal range of motion  He exhibits no edema  Neurological: He is alert and oriented to person, place, and time  Skin: Skin is warm and dry  Psychiatric: He has a normal mood and affect  His behavior is normal    Vitals reviewed        Lab Review: reviewed  Orders Only on 07/11/2018   Component Date Value    PTH, Intact 07/11/2018 73*    SL AMB CALCIUM 07/11/2018 9 0     Glucose 07/11/2018 71     BUN 07/11/2018 21     Creatinine 07/11/2018 0 92     eGFR Non African American 07/11/2018 77     SL AMB EGFR  AMER* 07/11/2018 89     SL AMB BUN/CREATININE RA* 54/01/5529 NOT APPLICABLE     Sodium 88/02/5024 139     SL AMB POTASSIUM 07/11/2018 3 8     Chloride 07/11/2018 102     SL AMB CARBON DIOXIDE 07/11/2018 28     SL AMB CALCIUM 07/11/2018 9 0     Calcium, Ionized 07/11/2018 5 0     SL AMB SED RATE BY MODIF* 07/11/2018 2     White Blood Cell Count 07/11/2018 8 7     Red Blood Cell Count 07/11/2018 4 84     Hemoglobin 07/11/2018 15 1     HCT 07/11/2018 44 9     MCV 07/11/2018 92 8     MCH 07/11/2018 31 2     MCHC 07/11/2018 33 6     RDW 07/11/2018 14 3     Platelet Count 38/56/2532 131*    SL AMB MPV 07/11/2018 10 6     Neutrophils (Absolute) 07/11/2018 6803     Lymphocytes (Absolute) 07/11/2018 948     Monocytes (Absolute) 07/11/2018 809     Eosinophils (Absolute) 07/11/2018 122     Basophils (Absolute) 07/11/2018 17     Neutrophils 07/11/2018 78 2     Lymphocytes 07/11/2018 10 9     Monocytes 07/11/2018 9 3     Eosinophils 07/11/2018 1 4     Basophils Relative 07/11/2018 0 2     C-Reactive Protein, Quant 07/11/2018 26 9*    Vitamin D, 25-Hydroxy, S* 07/11/2018 27*       Diagnostics:  No new chest imaging

## 2018-08-30 NOTE — ASSESSMENT & PLAN NOTE
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

## 2018-09-06 ENCOUNTER — HOSPITAL ENCOUNTER (OUTPATIENT)
Dept: RADIOLOGY | Facility: HOSPITAL | Age: 83
Discharge: HOME | End: 2018-09-06
Attending: INTERNAL MEDICINE
Payer: COMMERCIAL

## 2018-09-06 DIAGNOSIS — R91.8 LUNG INFILTRATE ON CT: ICD-10-CM

## 2018-09-06 PROCEDURE — 71250 CT THORAX DX C-: CPT

## 2018-09-07 ENCOUNTER — TELEPHONE (OUTPATIENT)
Dept: PULMONOLOGY | Facility: MEDICAL CENTER | Age: 83
End: 2018-09-07

## 2018-09-07 LAB
BASOPHILS # BLD AUTO: 21 CELLS/UL (ref 0–200)
BASOPHILS NFR BLD AUTO: 0.3 %
BUN SERPL-MCNC: 24 MG/DL (ref 7–25)
BUN/CREAT SERPL: NORMAL (CALC) (ref 6–22)
CALCIUM SERPL-MCNC: 9.2 MG/DL (ref 8.6–10.3)
CHLORIDE SERPL-SCNC: 102 MMOL/L (ref 98–110)
CO2 SERPL-SCNC: 30 MMOL/L (ref 20–32)
CREAT SERPL-MCNC: 0.92 MG/DL (ref 0.7–1.11)
CRP SERPL-MCNC: 10.5 MG/L
EOSINOPHIL # BLD AUTO: 117 CELLS/UL (ref 15–500)
EOSINOPHIL NFR BLD AUTO: 1.7 %
ERYTHROCYTE [DISTWIDTH] IN BLOOD BY AUTOMATED COUNT: 13.7 % (ref 11–15)
ERYTHROCYTE [SEDIMENTATION RATE] IN BLOOD BY WESTERGREN METHOD: 2 MM/H
GLUCOSE SERPL-MCNC: 83 MG/DL (ref 65–99)
HCT VFR BLD AUTO: 48.7 % (ref 38.5–50)
HGB BLD-MCNC: 16 G/DL (ref 13.2–17.1)
LYMPHOCYTES # BLD AUTO: 980 CELLS/UL (ref 850–3900)
LYMPHOCYTES NFR BLD AUTO: 14.2 %
MCH RBC QN AUTO: 30.7 PG (ref 27–33)
MCHC RBC AUTO-ENTMCNC: 32.9 G/DL (ref 32–36)
MCV RBC AUTO: 93.3 FL (ref 80–100)
MONOCYTES # BLD AUTO: 863 CELLS/UL (ref 200–950)
MONOCYTES NFR BLD AUTO: 12.5 %
NEUTROPHILS # BLD AUTO: 4920 CELLS/UL (ref 1500–7800)
NEUTROPHILS NFR BLD AUTO: 71.3 %
PLATELET # BLD AUTO: NORMAL THOUSAND/UL
POTASSIUM SERPL-SCNC: 3.8 MMOL/L (ref 3.5–5.3)
RBC # BLD AUTO: 5.22 MILLION/UL (ref 4.2–5.8)
SERVICE CMNT-IMP: NORMAL
SL AMB EGFR AFRICAN AMERICAN: 89 ML/MIN/1.73M2
SL AMB EGFR NON AFRICAN AMERICAN: 77 ML/MIN/1.73M2
SODIUM SERPL-SCNC: 141 MMOL/L (ref 135–146)
WBC # BLD AUTO: 6.9 THOUSAND/UL (ref 3.8–10.8)

## 2018-09-07 NOTE — TELEPHONE ENCOUNTER
pls call and let him know that it has not been looked at by a radiologist as of yet  I will call him as soon as I get the report  Thanks!

## 2018-09-12 DIAGNOSIS — R91.1 NODULE OF LEFT LUNG: Primary | ICD-10-CM

## 2018-09-12 NOTE — PROGRESS NOTES
Patient notified of results of his CT chest  He will need repeat CT in 3 months  He is notified of this  Order is placed

## 2018-09-17 ENCOUNTER — TELEPHONE (OUTPATIENT)
Dept: PULMONOLOGY | Facility: MEDICAL CENTER | Age: 83
End: 2018-09-17

## 2018-09-17 DIAGNOSIS — J43.2 CENTRILOBULAR EMPHYSEMA (HCC): ICD-10-CM

## 2018-09-17 DIAGNOSIS — J43.2 CENTRILOBULAR EMPHYSEMA (HCC): Primary | ICD-10-CM

## 2018-09-17 RX ORDER — IPRATROPIUM BROMIDE AND ALBUTEROL SULFATE 2.5; .5 MG/3ML; MG/3ML
3 SOLUTION RESPIRATORY (INHALATION) 2 TIMES DAILY
Qty: 60 VIAL | Refills: 3 | Status: SHIPPED | OUTPATIENT
Start: 2018-09-17 | End: 2018-11-27 | Stop reason: ALTCHOICE

## 2018-09-17 RX ORDER — IPRATROPIUM BROMIDE AND ALBUTEROL SULFATE 2.5; .5 MG/3ML; MG/3ML
3 SOLUTION RESPIRATORY (INHALATION) 2 TIMES DAILY
Qty: 60 VIAL | Refills: 0 | Status: SHIPPED | OUTPATIENT
Start: 2018-09-17 | End: 2018-09-17 | Stop reason: SDUPTHER

## 2018-09-17 RX ORDER — BUDESONIDE 0.5 MG/2ML
INHALANT ORAL
Qty: 1 ML | Refills: 0 | OUTPATIENT
Start: 2018-09-17

## 2018-09-17 NOTE — TELEPHONE ENCOUNTER
Requesting rx be sent to pharmacy for the nebulizer meds that you gave him samples of on Friday   [id number]

## 2018-09-18 DIAGNOSIS — J43.2 CENTRILOBULAR EMPHYSEMA (HCC): Primary | ICD-10-CM

## 2018-09-18 RX ORDER — BUDESONIDE 0.5 MG/2ML
INHALANT ORAL
Qty: 1 ML | Refills: 0 | Status: SHIPPED | OUTPATIENT
Start: 2018-09-18 | End: 2018-10-29

## 2018-10-09 ENCOUNTER — APPOINTMENT (EMERGENCY)
Dept: RADIOLOGY | Facility: HOSPITAL | Age: 83
End: 2018-10-09
Payer: COMMERCIAL

## 2018-10-09 ENCOUNTER — HOSPITAL ENCOUNTER (EMERGENCY)
Facility: HOSPITAL | Age: 83
Discharge: HOME | End: 2018-10-09
Attending: EMERGENCY MEDICINE | Admitting: EMERGENCY MEDICINE
Payer: COMMERCIAL

## 2018-10-09 VITALS
WEIGHT: 160 LBS | DIASTOLIC BLOOD PRESSURE: 90 MMHG | SYSTOLIC BLOOD PRESSURE: 124 MMHG | OXYGEN SATURATION: 96 % | HEART RATE: 82 BPM | RESPIRATION RATE: 24 BRPM | TEMPERATURE: 96.9 F | HEIGHT: 67 IN | BODY MASS INDEX: 25.11 KG/M2

## 2018-10-09 DIAGNOSIS — S22.000A COMPRESSION FRACTURE OF BODY OF THORACIC VERTEBRA (HCC): ICD-10-CM

## 2018-10-09 DIAGNOSIS — M54.9 BACK PAIN: Primary | ICD-10-CM

## 2018-10-09 PROCEDURE — 99283 EMERGENCY DEPT VISIT LOW MDM: CPT

## 2018-10-09 PROCEDURE — 72100 X-RAY EXAM L-S SPINE 2/3 VWS: CPT

## 2018-10-09 RX ORDER — MORPHINE SULFATE 15 MG/1
30 TABLET ORAL EVERY 4 HOURS PRN
Qty: 12 TABLET | Refills: 0 | Status: SHIPPED | OUTPATIENT
Start: 2018-10-09 | End: 2018-11-27 | Stop reason: ALTCHOICE

## 2018-10-09 RX ORDER — LIDOCAINE 50 MG/G
1 PATCH TOPICAL ONCE
Status: DISCONTINUED | OUTPATIENT
Start: 2018-10-09 | End: 2018-10-09 | Stop reason: HOSPADM

## 2018-10-09 RX ORDER — TRAMADOL HYDROCHLORIDE 50 MG/1
50 TABLET ORAL EVERY 6 HOURS PRN
COMMUNITY
End: 2018-11-27 | Stop reason: ALTCHOICE

## 2018-10-09 RX ORDER — ANTIOX #8/OM3/DHA/EPA/LUT/ZEAX 250-2.5 MG
1 CAPSULE ORAL 2 TIMES DAILY
COMMUNITY
End: 2018-11-27 | Stop reason: ALTCHOICE

## 2018-10-09 RX ADMIN — LIDOCAINE 1 PATCH: 50 PATCH CUTANEOUS at 08:24

## 2018-10-09 NOTE — DISCHARGE INSTRUCTIONS
Back Pain   WHAT YOU NEED TO KNOW:   Back pain is common  It can be caused by many conditions, such as arthritis or the breakdown of spinal discs  Your risk for back pain is increased by injuries, lack of activity, or repeated bending and twisting  You may feel sore or stiff on one or both sides of your back  The pain may spread to your buttocks or thighs  DISCHARGE INSTRUCTIONS:   Medicines:   · NSAIDs  help decrease swelling and pain  This medicine is available with or without a doctor's order  NSAIDs can cause stomach bleeding or kidney problems in certain people  If you take blood thinner medicine, always ask your healthcare provider if NSAIDs are safe for you  Always read the medicine label and follow directions  · Acetaminophen  decreases pain  It is available without a doctor's order  Ask how much to take and how often to take it  Follow directions  Acetaminophen can cause liver damage if not taken correctly  · Prescription pain medicine  may be given  Ask your healthcare provider how to take this medicine safely  · Take your medicine as directed  Contact your healthcare provider if you think your medicine is not helping or if you have side effects  Tell him or her if you are allergic to any medicine  Keep a list of the medicines, vitamins, and herbs you take  Include the amounts, and when and why you take them  Bring the list or the pill bottles to follow-up visits  Carry your medicine list with you in case of an emergency  Follow up with your healthcare provider in 2 weeks, or as directed:  Write down your questions so you remember to ask them during your visits  How to manage your back pain:   · Apply ice  on your back or affected area for 15 to 20 minutes every hour or as directed  Use an ice pack, or put crushed ice in a plastic bag  Cover it with a towel  Ice helps prevent tissue damage and decreases pain      · Apply heat  on your back or affected area for 20 to 30 minutes every 2 hours for as many days as directed  Heat helps decrease pain and muscle spasms  · Stay active  as much as you can without causing more pain  Bed rest could make your back pain worse  Avoid heavy lifting until your pain is gone  Return to the emergency department if:   · You have pain, numbness, or weakness in one or both legs  · Your pain becomes so severe that you cannot walk  · You cannot control your urine or bowel movements  · You have severe back pain with chest pain  · You have severe back pain, nausea, and vomiting  · You have severe back pain that spreads to your side or genital area  Contact your healthcare provider if:   · You have back pain that does not get better with rest and pain medicine  · You have a fever  · You have pain that worsens when you are on your back or when you rest     · You have pain that worsens when you cough or sneeze  · You lose weight without trying  · You have questions or concerns about your condition or care  © 2017 [address] Information is for End User's use only and may not be sold, redistributed or otherwise used for commercial purposes  All illustrations and images included in CareNotes® are the copyrighted property of A D [employer] M [employer] Inc  or [employer]  The above information is an  only  It is not intended as medical advice for individual conditions or treatments  Talk to your doctor, nurse or pharmacist before following any medical regimen to see if it is safe and effective for you  Vertebral Compression Fracture   WHAT YOU NEED TO KNOW:   A vertebral compression fracture (VCF) is a break in a part of the vertebra  Vertebrae are the round, strong bones that form your spine  VCFs most often occur in the thoracic (middle) and lumbar (lower) areas of your spine  Fractures may be mild to severe  DISCHARGE INSTRUCTIONS:   Medicines:   You may need any of the following:  · NSAIDs , such as ibuprofen, help decrease swelling, pain, and fever  This medicine is available with or without a doctor's order  NSAIDs can cause stomach bleeding or kidney problems in certain people  If you take blood thinner medicine, always ask if NSAIDs are safe for you  Always read the medicine label and follow directions  Do not give these medicines to children under 10months of age without direction from your child's healthcare provider  · Acetaminophen  decreases pain and fever  It is available without a doctor's order  Ask how much to take and how often to take it  Follow directions  Acetaminophen can cause liver damage if not taken correctly  · Prescription pain medicine  may be given  Ask your healthcare provider how to take this medicine safely  · Bisphosphonates and calcitonin  may be recommended to help your bones get stronger  They can decrease the pain of a VCF caused by osteoporosis, and decrease your risk for another fracture  · Take your medicine as directed  Contact your healthcare provider if you think your medicine is not helping or if you have side effects  Tell him or her if you are allergic to any medicine  Keep a list of the medicines, vitamins, and herbs you take  Include the amounts, and when and why you take them  Bring the list or the pill bottles to follow-up visits  Carry your medicine list with you in case of an emergency  Follow up with your healthcare provider as directed: You may need to return for x-rays or other tests  Write down your questions so you remember to ask them during your visits  Heat and ice:   · Apply ice  on your back for 15 to 20 minutes every hour or as directed  Use an ice pack, or put crushed ice in a plastic bag  Cover it with a towel  Ice helps prevent tissue damage and decreases swelling and pain  · Apply heat  on your back for 20 to 30 minutes every 2 hours for as many days as directed  Heat helps decrease pain and muscle spasms    Activity:   · Avoid activities that may make the pain worse, such as picking up heavy objects  When the pain decreases, begin normal, slow movements as directed by your healthcare provider  Your healthcare provider may have you do weight-bearing exercises such as walking  You may also do non-weight-bearing exercises such as swimming and bicycling  · You may need to use a walker or cane  Ask your healthcare provider for more information about how to use a cane or a walker  · When you  objects, bend at the hips and knees  Never bend from the waist only  Use bent knees and your leg muscles as you lift the object  While you lift the object, keep it close to your chest  Try not to twist or lift anything above your waist   Physical and occupational therapy:  Your healthcare provider may recommend physical and occupational therapy  A physical therapist teaches you exercises to help improve movement and strength, and to decrease pain  An occupational therapist teaches you skills to help with your daily activities  Manage pain during sleep:   · Do not sleep on a waterbed  Waterbeds do not provide good back support  · Sleep on a firm mattress  You may also put a ½ to 1-inch piece of plywood between the mattress and box spring  · Sleep on your back with a pillow under your knees  This will decrease pressure on your back  You may also sleep on your side with 1 or both of your knees bent and a pillow between them  It may also be helpful to sleep on your stomach with a pillow under you at waist level  Contact your healthcare provider if:   · You are not hungry, and you are losing weight  · You cannot sleep or rest because of back pain  · You have pain or swelling in your back that is getting worse, or does not go away  · You have questions or concerns about your condition or care  Seek care immediately or call 911 if:   · You feel lightheaded, short of breath, and have chest pain  · You cough up blood      · Your arm or leg feels warm, tender, and painful  It may look swollen and red  · You have new problems urinating or having bowel movements  · You have severe pain in your back after falling, bending forward, sneezing, or coughing strongly  · You suddenly cannot feel your legs  · You suddenly have trouble moving your arms or legs  © 2017 [address] Information is for End User's use only and may not be sold, redistributed or otherwise used for commercial purposes  All illustrations and images included in CareNotes® are the copyrighted property of A [employer] M , Inc  or [employer]  The above information is an  only  It is not intended as medical advice for individual conditions or treatments  Talk to your doctor, nurse or pharmacist before following any medical regimen to see if it is safe and effective for you

## 2018-10-09 NOTE — ED PROVIDER NOTES
History  Chief Complaint   Patient presents with    Back Pain     patient c/o lower back pain x 1 week  states one day prior to the pain starting, he was bent over most of the day working on his driveway  Patient presents for lower back pain for the last week  States the day before it started he was working on his driveway patching it and was bending over a lot  No new numbness in the legs, hx of neuropathy from cancer treatment 8 years ago  No bowel or bladder dysfunction  Pain does not go down his legs and no weakness  Taking pain pill he had from previous this weekend helping somewhat, but pain not improving  No fall or direct trauma  History provided by:  Patient   used: No    Back Pain       Prior to Admission Medications   Prescriptions Last Dose Informant Patient Reported? Taking?    Cholecalciferol (VITAMIN D3) 5000 units CAPS  Self Yes Yes   Sig: Take 5,000 Units by mouth daily     Multiple Vitamin (ONE-A-DAY MENS PO)  Self Yes Yes   Sig: Take 1 tablet by mouth daily     Multiple Vitamins-Minerals (PRESERVISION AREDS 2) CAPS 10/9/2018 at Unknown time  Yes Yes   Sig: Take 1 capsule by mouth 2 (two) times a day   atorvastatin (LIPITOR) 10 mg tablet  Self Yes Yes   Sig: Take 1 tablet by mouth daily   budesonide (PULMICORT) 0 5 mg/2 mL nebulizer solution   No No   Sig: USE ONE VIAL VIA NEBULIZER EVERY 12 HOURS   ipratropium-albuterol (DUO-NEB) 0 5-2 5 mg/3 mL nebulizer solution   No Yes   Sig: Take 1 vial (3 mL total) by nebulization 2 (two) times a day   levothyroxine 75 mcg tablet 10/9/2018 at Unknown time Self Yes Yes   Sig: Take 1 tablet by mouth daily   methylprednisolone (MEDROL) 4 mg tablet  Self Yes Yes   Sig: Take 8 mg by mouth daily with lunch     traMADol (ULTRAM) 50 mg tablet 10/9/2018 at Unknown time  Yes Yes   Sig: Take 50 mg by mouth every 6 (six) hours as needed for moderate pain      Facility-Administered Medications: None       Past Medical History: Diagnosis Date    Arthritis     Benign essential hypertension     Cancer (HCC)     throat    Chronic laryngitis     Chronic obstructive lung disease (HCC)     Chronic rhinitis     Cough     Disease of thyroid gland     Emphysema lung (HCC)     Heart murmur     Hypothyroidism     Inguinal hernia     Mixed hyperlipidemia     SOB (shortness of breath)     Temporal arteritis (HCC)     Throat cancer (HCC)     Thyroid disease        Past Surgical History:   Procedure Laterality Date    APPENDECTOMY      TRACHEOSTOMY         Family History   Problem Relation Age of Onset    Diabetes Family     Arthritis Family     Cancer Family     Hypertension Family     Heart disease Family      I have reviewed and agree with the history as documented  Social History   Substance Use Topics    Smoking status: Former Smoker     Packs/day: 1 00     Years: 60 00     Quit date: 5/31/2009    Smokeless tobacco: Never Used    Alcohol use Yes      Comment: rare        Review of Systems   Musculoskeletal: Positive for back pain  All other systems reviewed and are negative  Physical Exam  Physical Exam   Constitutional: He is oriented to person, place, and time  No distress  HENT:   Mouth/Throat: Oropharynx is clear and moist    Eyes: Pupils are equal, round, and reactive to light  Neck: Normal range of motion  Cardiovascular: Normal rate, regular rhythm and intact distal pulses  Pulmonary/Chest: Effort normal and breath sounds normal  No respiratory distress  Abdominal: Soft  There is no tenderness  Musculoskeletal: Normal range of motion  He exhibits tenderness  Arms:  Neurological: He is alert and oriented to person, place, and time  He exhibits normal muscle tone  Skin: Capillary refill takes less than 2 seconds  He is not diaphoretic  Nursing note and vitals reviewed        Vital Signs  ED Triage Vitals [10/09/18 0801]   Temperature Pulse Respirations Blood Pressure SpO2   (!) 96 9 °F (36 1 °C) 82 (!) 24 124/90 96 %      Temp Source Heart Rate Source Patient Position - Orthostatic VS BP Location FiO2 (%)   Tympanic Monitor Sitting Left arm --      Pain Score       Worst Possible Pain           Vitals:    10/09/18 0801   BP: 124/90   Pulse: 82   Patient Position - Orthostatic VS: Sitting       Visual Acuity      ED Medications  Medications   lidocaine (LIDODERM) 5 % patch 1 patch (not administered)       Diagnostic Studies  Results Reviewed     None                 XR lumbar spine 2 or 3 views    (Results Pending)              Procedures  Procedures       Phone Contacts  ED Phone Contact    ED Course                               MDM  Number of Diagnoses or Management Options  Back pain:   Compression fracture of body of thoracic vertebra [facility]):   Diagnosis management comments: Pulse ox 96% on RA indicating adequate oxygenation      Patient has new compression fx  Still has brace from last fracture  Advised to follow up with orthopedics  Amount and/or Complexity of Data Reviewed  Tests in the radiology section of CPT®: ordered and reviewed  Decide to obtain previous medical records or to obtain history from someone other than the patient: yes  Review and summarize past medical records: yes  Independent visualization of images, tracings, or specimens: yes    Patient Progress  Patient progress: stable    CritCare Time    Disposition  Final diagnoses:   None     ED Disposition     None      Follow-up Information    None         Patient's Medications   Discharge Prescriptions    No medications on file     No discharge procedures on file      ED Provider  Electronically Signed by           Millie Garnica DO  10/09/18 4835

## 2018-10-11 ENCOUNTER — VBI (OUTPATIENT)
Dept: FAMILY MEDICINE CLINIC | Facility: CLINIC | Age: 83
End: 2018-10-11

## 2018-10-11 NOTE — TELEPHONE ENCOUNTER
Pt was seen in [facility] on 10/9/18  CC: Back Pain  DX: Back Pain; compression fracture of body of thoracic vertebra

## 2018-10-17 ENCOUNTER — OFFICE VISIT (OUTPATIENT)
Dept: OBGYN CLINIC | Facility: CLINIC | Age: 83
End: 2018-10-17
Payer: COMMERCIAL

## 2018-10-17 VITALS
HEIGHT: 67 IN | HEART RATE: 75 BPM | WEIGHT: 160 LBS | SYSTOLIC BLOOD PRESSURE: 123 MMHG | DIASTOLIC BLOOD PRESSURE: 80 MMHG | BODY MASS INDEX: 25.11 KG/M2

## 2018-10-17 DIAGNOSIS — S32.010A CLOSED COMPRESSION FRACTURE OF FIRST LUMBAR VERTEBRA, INITIAL ENCOUNTER: Primary | ICD-10-CM

## 2018-10-17 PROCEDURE — 99213 OFFICE O/P EST LOW 20 MIN: CPT | Performed by: ORTHOPAEDIC SURGERY

## 2018-10-17 NOTE — PROGRESS NOTES
ASSESSMENT/PLAN:    Assessment:   83M Lumbar compression fracture    Plan:   - TLSO:   Patient has is own brace  - pain control:  Patient has tramadol from primary care  -  Follow-up in 6 weeks p r n           _____________________________________________________  CHIEF COMPLAINT:  Chief Complaint   Patient presents with    Lower Back - Follow-up         SUBJECTIVE:  Kasi Beverly is a 81y o  year old male who presents  With 2 weeks of lower midline back pain  Patient was working on his driveway 2 weeks ago and felt a sudden pain in his lower back  No radiation to his lower extremities  He went to the emergency department several days later and x-rays were taken  These showed a likely new compression fracture at T12-L1  He was given pain control and was told to wear his brace  Since then his pain has slowly improved although it is still present  He plans to take it easy regarding activity and continue to wear his brace       PAST MEDICAL HISTORY:  Past Medical History:   Diagnosis Date    Arthritis     Benign essential hypertension     Cancer ([address] )     throat    Chronic laryngitis     Chronic obstructive lung disease (HCC)     Chronic rhinitis     Cough     Disease of thyroid gland     Emphysema lung (HCC)     Heart murmur     Hypothyroidism     Inguinal hernia     Mixed hyperlipidemia     SOB (shortness of breath)     Temporal arteritis (HCC)     Throat cancer (HCC)     Thyroid disease        PAST SURGICAL HISTORY:  Past Surgical History:   Procedure Laterality Date    APPENDECTOMY      TRACHEOSTOMY         FAMILY HISTORY:  Family History   Problem Relation Age of Onset    Diabetes Family     Arthritis Family     Cancer Family     Hypertension Family     Heart disease Family        SOCIAL HISTORY:  Social History   Substance Use Topics    Smoking status: Former Smoker     Packs/day: 1 00     Years: 60 00     Quit date: 5/31/2009    Smokeless tobacco: Never Used    Alcohol use Yes      Comment: rare       MEDICATIONS:    Current Outpatient Prescriptions:     atorvastatin (LIPITOR) 10 mg tablet, Take 1 tablet by mouth daily, Disp: , Rfl:     budesonide (PULMICORT) 0 5 mg/2 mL nebulizer solution, USE ONE VIAL VIA NEBULIZER EVERY 12 HOURS, Disp: 1 mL, Rfl: 0    Cholecalciferol (VITAMIN D3) 5000 units CAPS, Take 5,000 Units by mouth daily  , Disp: , Rfl:     ipratropium-albuterol (DUO-NEB) 0 5-2 5 mg/3 mL nebulizer solution, Take 1 vial (3 mL total) by nebulization 2 (two) times a day, Disp: 60 vial, Rfl: 3    levothyroxine 75 mcg tablet, Take 1 tablet by mouth daily, Disp: , Rfl:     methylprednisolone (MEDROL) 4 mg tablet, Take 8 mg by mouth daily with lunch  , Disp: , Rfl:     morphine (MSIR) 15 mg tablet, Take 2 tablets (30 mg total) by mouth every 4 (four) hours as needed for severe pain for up to 12 doses Max Daily Amount: 180 mg, Disp: 12 tablet, Rfl: 0    Multiple Vitamin (ONE-A-DAY MENS PO), Take 1 tablet by mouth daily  , Disp: , Rfl:     Multiple Vitamins-Minerals (PRESERVISION AREDS 2) CAPS, Take 1 capsule by mouth 2 (two) times a day, Disp: , Rfl:     traMADol (ULTRAM) 50 mg tablet, Take 50 mg by mouth every 6 (six) hours as needed for moderate pain, Disp: , Rfl:     ALLERGIES:  Allergies   Allergen Reactions    Cefdinir     Other        REVIEW OF SYSTEMS:  Pertinent items are noted in HPI  A comprehensive review of systems was negative      LABS:  HgA1c: No results found for: HGBA1C  BMP:   Lab Results   Component Value Date    CALCIUM 9 2 09/06/2018     06/20/2018    K 3 8 09/06/2018    CO2 30 09/06/2018     09/06/2018    BUN 24 09/06/2018    CREATININE 0 95 06/20/2018       _____________________________________________________  PHYSICAL EXAMINATION:  General: well developed and well nourished, alert, oriented times 3 and appears comfortable  Psychiatric: Normal  HEENT: Trachea Midline, No torticollis  Cardiovascular: No discernable arrhythmia  Pulmonary: No wheezing or stridor  Skin: No masses, erthema, lacerations, fluctation, ulcerations  Neurovascular: Sensation Intact to the Median, Ulnar, Radial Nerve, Motor Intact to the Median, Ulnar, Radial Nerve and Pulses Intact    MUSCULOSKELETAL EXAMINATION:  Extremities:    +midline tenderness Lumbar region,  Negative passive straight leg raise bilaterally, sensation intact and symmetric to light touch L2-S1, motor intact and symmetric to light touch L2-S1      _____________________________________________________  STUDIES REVIEWED:    Lumbar spine x-rays October 2018:   Loss of height T12 and L1 vertebral bodies    PROCEDURES PERFORMED:  Procedures  No Procedures performed today

## 2018-10-25 LAB
BASOPHILS # BLD AUTO: 28 CELLS/UL (ref 0–200)
BASOPHILS NFR BLD AUTO: 0.3 %
BUN SERPL-MCNC: 21 MG/DL (ref 7–25)
BUN/CREAT SERPL: NORMAL (CALC) (ref 6–22)
CALCIUM SERPL-MCNC: 9.1 MG/DL (ref 8.6–10.3)
CHLORIDE SERPL-SCNC: 99 MMOL/L (ref 98–110)
CO2 SERPL-SCNC: 31 MMOL/L (ref 20–32)
CREAT SERPL-MCNC: 1.05 MG/DL (ref 0.7–1.11)
CRP SERPL-MCNC: 63.2 MG/L
EOSINOPHIL # BLD AUTO: 94 CELLS/UL (ref 15–500)
EOSINOPHIL NFR BLD AUTO: 1 %
ERYTHROCYTE [DISTWIDTH] IN BLOOD BY AUTOMATED COUNT: 13.2 % (ref 11–15)
ERYTHROCYTE [SEDIMENTATION RATE] IN BLOOD BY WESTERGREN METHOD: 11 MM/H
GLUCOSE SERPL-MCNC: 80 MG/DL (ref 65–99)
HCT VFR BLD AUTO: 46.5 % (ref 38.5–50)
HGB BLD-MCNC: 15.7 G/DL (ref 13.2–17.1)
LYMPHOCYTES # BLD AUTO: 536 CELLS/UL (ref 850–3900)
LYMPHOCYTES NFR BLD AUTO: 5.7 %
MCH RBC QN AUTO: 31.2 PG (ref 27–33)
MCHC RBC AUTO-ENTMCNC: 33.8 G/DL (ref 32–36)
MCV RBC AUTO: 92.4 FL (ref 80–100)
MONOCYTES # BLD AUTO: 978 CELLS/UL (ref 200–950)
MONOCYTES NFR BLD AUTO: 10.4 %
NEUTROPHILS # BLD AUTO: 7764 CELLS/UL (ref 1500–7800)
NEUTROPHILS NFR BLD AUTO: 82.6 %
PLATELET # BLD AUTO: 157 THOUSAND/UL (ref 140–400)
PMV BLD REES-ECKER: 10.2 FL (ref 7.5–12.5)
POTASSIUM SERPL-SCNC: 3.9 MMOL/L (ref 3.5–5.3)
RBC # BLD AUTO: 5.03 MILLION/UL (ref 4.2–5.8)
SL AMB EGFR AFRICAN AMERICAN: 76 ML/MIN/1.73M2
SL AMB EGFR NON AFRICAN AMERICAN: 66 ML/MIN/1.73M2
SODIUM SERPL-SCNC: 139 MMOL/L (ref 135–146)
WBC # BLD AUTO: 9.4 THOUSAND/UL (ref 3.8–10.8)

## 2018-10-29 ENCOUNTER — HOSPITAL ENCOUNTER (EMERGENCY)
Facility: HOSPITAL | Age: 83
Discharge: HOME | End: 2018-10-29
Attending: EMERGENCY MEDICINE | Admitting: EMERGENCY MEDICINE
Payer: COMMERCIAL

## 2018-10-29 ENCOUNTER — APPOINTMENT (EMERGENCY)
Dept: RADIOLOGY | Facility: HOSPITAL | Age: 83
End: 2018-10-29
Payer: COMMERCIAL

## 2018-10-29 VITALS
SYSTOLIC BLOOD PRESSURE: 159 MMHG | WEIGHT: 160 LBS | RESPIRATION RATE: 21 BRPM | TEMPERATURE: 96.5 F | DIASTOLIC BLOOD PRESSURE: 99 MMHG | OXYGEN SATURATION: 94 % | BODY MASS INDEX: 25.06 KG/M2 | HEART RATE: 76 BPM

## 2018-10-29 DIAGNOSIS — R07.81 RIB PAIN ON RIGHT SIDE: Primary | ICD-10-CM

## 2018-10-29 LAB
ANION GAP SERPL CALCULATED.3IONS-SCNC: 10 MMOL/L (ref 4–13)
BASOPHILS # BLD AUTO: 0.03 THOUSANDS/ΜL (ref 0–0.1)
BASOPHILS NFR BLD AUTO: 0 % (ref 0–1)
BUN SERPL-MCNC: 20 MG/DL (ref 5–25)
CALCIUM SERPL-MCNC: 9.2 MG/DL (ref 8.3–10.1)
CHLORIDE SERPL-SCNC: 103 MMOL/L (ref 100–108)
CO2 SERPL-SCNC: 27 MMOL/L (ref 21–32)
CREAT SERPL-MCNC: 1.01 MG/DL (ref 0.6–1.3)
EOSINOPHIL # BLD AUTO: 0.08 THOUSAND/ΜL (ref 0–0.61)
EOSINOPHIL NFR BLD AUTO: 1 % (ref 0–6)
ERYTHROCYTE [DISTWIDTH] IN BLOOD BY AUTOMATED COUNT: 13.9 % (ref 11.6–15.1)
GFR SERPL CREATININE-BSD FRML MDRD: 69 ML/MIN/1.73SQ M
GLUCOSE SERPL-MCNC: 114 MG/DL (ref 65–140)
HCT VFR BLD AUTO: 49.7 % (ref 36.5–49.3)
HGB BLD-MCNC: 16.1 G/DL (ref 12–17)
IMM GRANULOCYTES # BLD AUTO: 0.03 THOUSAND/UL (ref 0–0.2)
IMM GRANULOCYTES NFR BLD AUTO: 0 % (ref 0–2)
LYMPHOCYTES # BLD AUTO: 0.41 THOUSANDS/ΜL (ref 0.6–4.47)
LYMPHOCYTES NFR BLD AUTO: 5 % (ref 14–44)
MCH RBC QN AUTO: 30.1 PG (ref 26.8–34.3)
MCHC RBC AUTO-ENTMCNC: 32.4 G/DL (ref 31.4–37.4)
MCV RBC AUTO: 93 FL (ref 82–98)
MONOCYTES # BLD AUTO: 0.83 THOUSAND/ΜL (ref 0.17–1.22)
MONOCYTES NFR BLD AUTO: 9 % (ref 4–12)
NEUTROPHILS # BLD AUTO: 7.71 THOUSANDS/ΜL (ref 1.85–7.62)
NEUTS SEG NFR BLD AUTO: 85 % (ref 43–75)
NRBC BLD AUTO-RTO: 0 /100 WBCS
PLATELET # BLD AUTO: 142 THOUSANDS/UL (ref 149–390)
PMV BLD AUTO: 10 FL (ref 8.9–12.7)
POTASSIUM SERPL-SCNC: 3.9 MMOL/L (ref 3.5–5.3)
RBC # BLD AUTO: 5.35 MILLION/UL (ref 3.88–5.62)
SODIUM SERPL-SCNC: 140 MMOL/L (ref 136–145)
TROPONIN I SERPL-MCNC: <0.02 NG/ML
WBC # BLD AUTO: 9.09 THOUSAND/UL (ref 4.31–10.16)

## 2018-10-29 PROCEDURE — 71260 CT THORAX DX C+: CPT

## 2018-10-29 PROCEDURE — 99284 EMERGENCY DEPT VISIT MOD MDM: CPT

## 2018-10-29 PROCEDURE — 84484 ASSAY OF TROPONIN QUANT: CPT | Performed by: EMERGENCY MEDICINE

## 2018-10-29 PROCEDURE — 74177 CT ABD & PELVIS W/CONTRAST: CPT

## 2018-10-29 PROCEDURE — 93005 ELECTROCARDIOGRAM TRACING: CPT

## 2018-10-29 PROCEDURE — 85025 COMPLETE CBC W/AUTO DIFF WBC: CPT | Performed by: EMERGENCY MEDICINE

## 2018-10-29 PROCEDURE — 80048 BASIC METABOLIC PNL TOTAL CA: CPT | Performed by: EMERGENCY MEDICINE

## 2018-10-29 PROCEDURE — 36415 COLL VENOUS BLD VENIPUNCTURE: CPT | Performed by: EMERGENCY MEDICINE

## 2018-10-29 RX ORDER — LIDOCAINE 50 MG/G
1 PATCH TOPICAL ONCE
Status: DISCONTINUED | OUTPATIENT
Start: 2018-10-29 | End: 2018-10-29 | Stop reason: HOSPADM

## 2018-10-29 RX ADMIN — LIDOCAINE 1 PATCH: 50 PATCH TOPICAL at 16:20

## 2018-10-29 RX ADMIN — IOHEXOL 100 ML: 350 INJECTION, SOLUTION INTRAVENOUS at 14:29

## 2018-10-29 NOTE — DISCHARGE INSTRUCTIONS
If any new or worsening symptoms please return to emergency department immediately      Chest Pain   WHAT Fead:   Chest pain can be caused by a range of conditions, from not serious to life-threatening  Chest pain can be a symptom of a digestive problem, such as acid reflux or a stomach ulcer  An anxiety attack or a strong emotion, such as anger, can also cause chest pain  Infection, inflammation, or a fracture in the bones or cartilage in your chest can cause pain or discomfort  Sometimes chest pain or pressure is caused by poor blood flow to your heart (angina)  Chest pain may also be caused by life-threatening conditions such as a heart attack or blood clot in your lungs  DISCHARGE INSTRUCTIONS:   Call 911 if:   · You have any of the following signs of a heart attack:      ¨ Squeezing, pressure, or pain in your chest that lasts longer than 5 minutes or returns    ¨ Discomfort or pain in your back, neck, jaw, stomach, or arm     ¨ Trouble breathing    ¨ Nausea or vomiting    ¨ Lightheadedness or a sudden cold sweat, especially with chest pain or trouble breathing    Seek care immediately if:   · You have chest discomfort that gets worse, even with medicine  · You cough or vomit blood  · Your bowel movements are black or bloody  · You cannot stop vomiting, or it hurts to swallow  Contact your healthcare provider if:   · You have questions or concerns about your condition or care  Medicines:   · Medicines  may be given to treat the cause of your chest pain  Examples include pain medicine, anxiety medicine, or medicines to increase blood flow to your heart  · Do not take certain medicines without asking your healthcare provider first   These include NSAIDs, herbal or vitamin supplements, or hormones (estrogen or progestin)  · Take your medicine as directed  Contact your healthcare provider if you think your medicine is not helping or if you have side effects   Tell him or her if you are allergic to any medicine  Keep a list of the medicines, vitamins, and herbs you take  Include the amounts, and when and why you take them  Bring the list or the pill bottles to follow-up visits  Carry your medicine list with you in case of an emergency  Follow up with your healthcare provider within 72 hours, or as directed: You may need to return for more tests to find the cause of your chest pain  You may be referred to a specialist, such as a cardiologist or gastroenterologist  Write down your questions so you remember to ask them during your visits  Healthy living tips: The following are general healthy guidelines  If your chest pain is caused by a heart problem, your healthcare provider will give you specific guidelines to follow  · Do not smoke  Nicotine and other chemicals in cigarettes and cigars can cause lung and heart damage  Ask your healthcare provider for information if you currently smoke and need help to quit  E-cigarettes or smokeless tobacco still contain nicotine  Talk to your healthcare provider before you use these products  · Eat a variety of healthy, low-fat foods  Healthy foods include fruits, vegetables, whole-grain breads, low-fat dairy products, beans, lean meats, and fish  Ask for more information about a heart healthy diet  · Ask about activity  Your healthcare provider will tell you which activities to limit or avoid  Ask when you can drive, return to work, and have sex  Ask about the best exercise plan for you  · Maintain a healthy weight  Ask your healthcare provider how much you should weigh  Ask him or her to help you create a weight loss plan if you are overweight  © 2017 [facility] Information is for End User's use only and may not be sold, redistributed or otherwise used for commercial purposes  All illustrations and images included in CareNotes® are the copyrighted property of [employer]  or Joel Mayer    The above information is an  only  It is not intended as medical advice for individual conditions or treatments  Talk to your doctor, nurse or pharmacist before following any medical regimen to see if it is safe and effective for you

## 2018-10-29 NOTE — ED PROVIDER NOTES
History  Chief Complaint   Patient presents with    Rib Pain     intermittent R rib pain on and off for about a week  now hurting in R shoulder as well, painful to move, recent hx of fx back     HPI    This is an 81 year male that presents today with rib pain  Patient states for the past week he has been on and off right posterior rib pain which worse with inspiration movement  He also states back pain and abdominal pain as well  Patient states he is on chronic steroids for temporal arteritis  Patient states he has had fractures before without any injuries  States a week ago he pulled a tractor and started experiencing pain  States he has been on tramadol along with morphine which she stopped taking due to constipation  Was taking a cuts for his compression fracture which was seen last month  Denies any injuries  No chest pain  He but does state also some mild shoulder tenderness as well  No shortness of breath  No other complaints no fevers or chills  81 year male that presents today with rib pain along with pain  Will get some imaging  I offered patient pain medication but she refused    Prior to Admission Medications   Prescriptions Last Dose Informant Patient Reported? Taking?    Cholecalciferol (VITAMIN D3) 5000 units CAPS  Self Yes No   Sig: Take 5,000 Units by mouth daily     Multiple Vitamin (ONE-A-DAY MENS PO)  Self Yes No   Sig: Take 1 tablet by mouth daily     Multiple Vitamins-Minerals (PRESERVISION AREDS 2) CAPS  Self Yes No   Sig: Take 1 capsule by mouth 2 (two) times a day   atorvastatin (LIPITOR) 10 mg tablet  Self Yes No   Sig: Take 1 tablet by mouth daily   ipratropium-albuterol (DUO-NEB) 0 5-2 5 mg/3 mL nebulizer solution  Self No No   Sig: Take 1 vial (3 mL total) by nebulization 2 (two) times a day   levothyroxine 75 mcg tablet  Self Yes No   Sig: Take 1 tablet by mouth daily   methylprednisolone (MEDROL) 4 mg tablet  Self Yes No   Sig: Take 8 mg by mouth daily with lunch morphine (MSIR) 15 mg tablet Past Month at Unknown time Self No Yes   Sig: Take 2 tablets (30 mg total) by mouth every 4 (four) hours as needed for severe pain for up to 12 doses Max Daily Amount: 180 mg   traMADol (ULTRAM) 50 mg tablet  Self Yes No   Sig: Take 50 mg by mouth every 6 (six) hours as needed for moderate pain      Facility-Administered Medications: None       Past Medical History:   Diagnosis Date    Arthritis     Benign essential hypertension     Cancer (HCC)     throat    Chronic laryngitis     Chronic obstructive lung disease (HCC)     Chronic rhinitis     Cough     Disease of thyroid gland     Emphysema lung (HCC)     Heart murmur     Hypothyroidism     Inguinal hernia     Mixed hyperlipidemia     SOB (shortness of breath)     Temporal arteritis (HCC)     Throat cancer ([address] )     Thyroid disease        Past Surgical History:   Procedure Laterality Date    APPENDECTOMY      TRACHEOSTOMY         Family History   Problem Relation Age of Onset    Diabetes Family     Arthritis Family     Cancer Family     Hypertension Family     Heart disease Family      I have reviewed and agree with the history as documented  Social History   Substance Use Topics    Smoking status: Former Smoker     Packs/day: 1 00     Years: 60 00     Quit date: 5/31/2009    Smokeless tobacco: Never Used    Alcohol use Yes      Comment: rare        Review of Systems   Constitutional: Negative  Negative for diaphoresis and fever  HENT: Negative  Respiratory: Negative  Negative for cough, shortness of breath and wheezing  Cardiovascular: Negative  Negative for chest pain, palpitations and leg swelling  Right posterior and lateral chest pain wall with palpation  Gastrointestinal: Negative for abdominal distention, abdominal pain, nausea and vomiting  Right upper quadrant pain     Genitourinary: Negative  Musculoskeletal: Negative  Skin: Negative  Neurological: Negative  Psychiatric/Behavioral: Negative  All other systems reviewed and are negative  Physical Exam  Physical Exam   Constitutional: He is oriented to person, place, and time  He appears well-developed and well-nourished  No distress  HENT:   Head: Normocephalic and atraumatic  Nose: Nose normal    Mouth/Throat: Oropharynx is clear and moist    Eyes: Pupils are equal, round, and reactive to light  Conjunctivae and EOM are normal    Neck: Normal range of motion  Neck supple  Cardiovascular: Normal rate, regular rhythm and normal heart sounds  No murmur heard  Pulmonary/Chest: Effort normal and breath sounds normal  No respiratory distress  He has no wheezes  He has no rales  He exhibits tenderness  Tenderness right rib area  No rashes  Abdominal: Soft  Bowel sounds are normal  He exhibits no distension  There is no tenderness  There is no rebound and no guarding  Musculoskeletal: Normal range of motion  He exhibits no edema, tenderness or deformity  Neurological: He is alert and oriented to person, place, and time  No cranial nerve deficit  Skin: Skin is warm and dry  No rash noted  He is not diaphoretic  No pallor  Psychiatric: He has a normal mood and affect  Vitals reviewed        Vital Signs  ED Triage Vitals [10/29/18 1320]   Temperature Pulse Respirations Blood Pressure SpO2   (!) 96 5 °F (35 8 °C) 94 (!) 28 137/96 95 %      Temp Source Heart Rate Source Patient Position - Orthostatic VS BP Location FiO2 (%)   Tympanic Monitor Sitting Left arm --      Pain Score       8           Vitals:    10/29/18 1320 10/29/18 1530   BP: 137/96 159/99   Pulse: 94 76   Patient Position - Orthostatic VS: Sitting Sitting       Visual Acuity      ED Medications  Medications   lidocaine (LIDODERM) 5 % patch 1 patch (not administered)   iohexol (OMNIPAQUE) 350 MG/ML injection (MULTI-DOSE) 100 mL (100 mL Intravenous Given 10/29/18 1429)       Diagnostic Studies  Results Reviewed     Procedure Component Value Units Date/Time    Troponin I [[id number]]  (Normal) Collected:  10/29/18 1359    Lab Status:  Final result Specimen:  Blood from Arm, Left Updated:  10/29/18 1426     Troponin I <0 02 ng/mL     Basic metabolic panel [[id number]] Collected:  10/29/18 1359    Lab Status:  Final result Specimen:  Blood from Arm, Left Updated:  10/29/18 1417     Sodium 140 mmol/L      Potassium 3 9 mmol/L      Chloride 103 mmol/L      CO2 27 mmol/L      ANION GAP 10 mmol/L      BUN 20 mg/dL      Creatinine 1 01 mg/dL      Glucose 114 mg/dL      Calcium 9 2 mg/dL      eGFR 69 ml/min/1 73sq m     Narrative:         National Kidney Disease Education Program recommendations are as follows:  GFR calculation is accurate only with a steady state creatinine  Chronic Kidney disease less than 60 ml/min/1 73 sq  meters  Kidney failure less than 15 ml/min/1 73 sq  meters  CBC and differential [[id number]]  (Abnormal) Collected:  10/29/18 1359    Lab Status:  Final result Specimen:  Blood from Arm, Left Updated:  10/29/18 1407     WBC 9 09 Thousand/uL      RBC 5 35 Million/uL      Hemoglobin 16 1 g/dL      Hematocrit 49 7 (H) %      MCV 93 fL      MCH 30 1 pg      MCHC 32 4 g/dL      RDW 13 9 %      MPV 10 0 fL      Platelets 482 (L) Thousands/uL      nRBC 0 /100 WBCs      Neutrophils Relative 85 (H) %      Immat GRANS % 0 %      Lymphocytes Relative 5 (L) %      Monocytes Relative 9 %      Eosinophils Relative 1 %      Basophils Relative 0 %      Neutrophils Absolute 7 71 (H) Thousands/µL      Immature Grans Absolute 0 03 Thousand/uL      Lymphocytes Absolute 0 41 (L) Thousands/µL      Monocytes Absolute 0 83 Thousand/µL      Eosinophils Absolute 0 08 Thousand/µL      Basophils Absolute 0 03 Thousands/µL                  CT chest abdomen pelvis w contrast   Final Result by Chuck Ridley DO (10/29 1530)      No evidence of right rib fractures or acute pathology to account for patient's symptoms        Compression fracture deformities of T7, T11, T12 and L1 as above, present on previous imaging  Mild COPD with resolution of previously seen left lower lobe nodule  Stable 4 3 cm ascending aortic aneurysm  3 cm infrarenal abdominal aortic aneurysm and mild aneurysmal 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  Additional incidental findings including diffuse colonic diverticulosis, renal cysts, prostatomegaly and bilateral inguinal hernias (containing fat and bowel on the right)  No evidence of bowel obstruction  Workstation performed: [id number]                    Procedures  Procedures       Phone Contacts  ED Phone Contact    ED Course  ED Course as of Oct 29 1605   Mon Oct 29, 2018   1352 Procedure Note: EKG  Date/Time: 10/29/18 1:52 PM   Performed by: Fern Mayes   Authorized by: Fern Mayes   Indications / Diagnosis: CP  ECG reviewed by me, the ED Provider: yes   The EKG demonstrates:  Rhythm: normal sinus  Intervals: normal intervals  Axis: normal axis  QRS/Blocks: normal QRS  ST Changes: No acute ST Changes, no STD/[place]       1556 I explained all the results including the aneurysm  Patient states his pain is better  He states he probably pulled a muscle when he was mowing his lawn  He states he will follow up with his PCP  Patient does not want any other pain medication  Will discharge patient with appropriate return precautions  MDM  CritCare Time    Disposition  Final diagnoses:   Rib pain on right side     Time reflects when diagnosis was documented in both MDM as applicable and the Disposition within this note     Time User Action Codes Description Comment    10/29/2018  3:58 PM Shea Brar Add [R07 81] Rib pain on right side       ED Disposition     ED Disposition Condition Comment    Discharge  [facility] discharge to home/self care      Condition at discharge: Good        Follow-up Information     Follow up With Specialties Details Why Angelo Sommers MD Family Medicine Schedule an appointment as soon as possible for a visit  [address]   [phone]            Patient's Medications   Discharge Prescriptions    No medications on file     No discharge procedures on file      ED Provider  Electronically Signed by           Clarice Runner, MD  10/29/18 5285

## 2018-10-30 ENCOUNTER — VBI (OUTPATIENT)
Dept: FAMILY MEDICINE CLINIC | Facility: CLINIC | Age: 83
End: 2018-10-30

## 2018-10-30 NOTE — TELEPHONE ENCOUNTER
Pt was seen in [address] on 10/29/18  CC: Rib Pain  DX: Rib pain on right side  Pt states that he is feeling better  The rx that was given at ED has seemed to help and  Pt is in no pain at this point  Pt will call if f/u appt is needed

## 2018-11-01 LAB
ATRIAL RATE: 80 BPM
P AXIS: 27 DEGREES
PR INTERVAL: 144 MS
QRS AXIS: -28 DEGREES
QRSD INTERVAL: 86 MS
QT INTERVAL: 372 MS
QTC INTERVAL: 429 MS
T WAVE AXIS: 48 DEGREES
VENTRICULAR RATE: 80 BPM

## 2018-11-01 PROCEDURE — 93010 ELECTROCARDIOGRAM REPORT: CPT | Performed by: INTERNAL MEDICINE

## 2018-11-08 LAB
25(OH)D3 SERPL-MCNC: 64 NG/ML (ref 30–100)
CALCIUM SERPL-MCNC: 9.1 MG/DL (ref 8.6–10.3)
HBV CORE AB SERPL QL IA: NORMAL
HBV SURFACE AB SER QL IA: NORMAL
HBV SURFACE AG SERPL QL IA: NORMAL
HCV AB S/CO SERPL IA: 0.01
HCV AB SERPL QL IA: NORMAL
M TB IFN-G CD4+ BCKGRND COR BLD-ACNC: 0.02 IU/ML
M TB IFN-G CD4+ BCKGRND COR BLD-ACNC: 0.03 IU/ML
M TB IFN-G CD4+ T-CELLS BLD-ACNC: 0.14 IU/ML
M TB TUBERC IFN-G BLD QL: NEGATIVE
M TB TUBERC IGNF/MITOGEN IGNF CONTROL: >10 IU/ML
PTH-INTACT SERPL-MCNC: 49 PG/ML (ref 14–64)

## 2018-11-12 ENCOUNTER — TELEPHONE (OUTPATIENT)
Dept: PULMONOLOGY | Facility: MEDICAL CENTER | Age: 83
End: 2018-11-12

## 2018-11-27 ENCOUNTER — OFFICE VISIT (OUTPATIENT)
Dept: FAMILY MEDICINE CLINIC | Facility: CLINIC | Age: 83
End: 2018-11-27
Payer: COMMERCIAL

## 2018-11-27 VITALS
HEART RATE: 75 BPM | RESPIRATION RATE: 18 BRPM | OXYGEN SATURATION: 95 % | DIASTOLIC BLOOD PRESSURE: 70 MMHG | SYSTOLIC BLOOD PRESSURE: 112 MMHG | TEMPERATURE: 98.2 F | BODY MASS INDEX: 23.18 KG/M2 | WEIGHT: 148 LBS

## 2018-11-27 DIAGNOSIS — I10 BENIGN ESSENTIAL HYPERTENSION: Primary | ICD-10-CM

## 2018-11-27 DIAGNOSIS — C32.9 LARYNGEAL CANCER (HCC): ICD-10-CM

## 2018-11-27 DIAGNOSIS — J43.2 CENTRILOBULAR EMPHYSEMA (HCC): ICD-10-CM

## 2018-11-27 DIAGNOSIS — M31.6 TEMPORAL ARTERITIS (HCC): ICD-10-CM

## 2018-11-27 DIAGNOSIS — R97.20 ELEVATED PROSTATE SPECIFIC ANTIGEN (PSA): ICD-10-CM

## 2018-11-27 PROCEDURE — 3078F DIAST BP <80 MM HG: CPT | Performed by: FAMILY MEDICINE

## 2018-11-27 PROCEDURE — 3074F SYST BP LT 130 MM HG: CPT | Performed by: FAMILY MEDICINE

## 2018-11-27 PROCEDURE — 99214 OFFICE O/P EST MOD 30 MIN: CPT | Performed by: FAMILY MEDICINE

## 2018-11-27 PROCEDURE — 1036F TOBACCO NON-USER: CPT | Performed by: FAMILY MEDICINE

## 2018-11-27 PROCEDURE — 3725F SCREEN DEPRESSION PERFORMED: CPT | Performed by: FAMILY MEDICINE

## 2018-11-27 PROCEDURE — 1160F RVW MEDS BY RX/DR IN RCRD: CPT | Performed by: FAMILY MEDICINE

## 2018-11-27 NOTE — PROGRESS NOTES
Assessment/Plan:    Will f/u with pulm and rheum as well as urol  Cont present rx   calll when meds are needed instructions     There are no diagnoses linked to this encounter  Subjective:      Patient ID: Luna Escobar is a 81 y o  male  Patient seen in f/u  Pain from pulled muscle has diminished but has noted some more difficulty in breathing  Has been placed on pulm meds    Will be going on new med for temporal arteritis  Will need med renewals  Has been having some reflux  Sx and nail changes   Has lost about 20 pounds         The following portions of the patient's history were reviewed and updated as appropriate: past family history, past medical history and past social history  Review of Systems   Constitutional: Positive for activity change, fatigue and unexpected weight change  HENT: Negative  Eyes: Negative  Respiratory: Positive for shortness of breath  Gastrointestinal:        See hpi   Musculoskeletal: Negative  Skin:        See hpi   Neurological: Negative  Psychiatric/Behavioral: Negative  Objective:      /70 (BP Location: Left arm, Patient Position: Sitting, Cuff Size: Standard)   Pulse 75   Temp 98 2 °F (36 8 °C) (Tympanic)   Resp 18   Wt 67 1 kg (148 lb)   SpO2 95%   BMI 23 18 kg/m²          Physical Exam   Constitutional: He is oriented to person, place, and time  He appears well-developed and well-nourished  HENT:   Head: Normocephalic and atraumatic  Right Ear: External ear normal    Left Ear: External ear normal    Eyes: Pupils are equal, round, and reactive to light  Conjunctivae and EOM are normal    Neck: Normal range of motion  Neck supple  Cardiovascular: Normal rate, regular rhythm and normal heart sounds  Pulmonary/Chest: Effort normal and breath sounds normal    Abdominal: Soft  Musculoskeletal: Normal range of motion  Neurological: He is alert and oriented to person, place, and time     Psychiatric: He has a normal mood and affect   His behavior is normal

## 2018-12-03 ENCOUNTER — TELEPHONE (OUTPATIENT)
Dept: FAMILY MEDICINE CLINIC | Facility: CLINIC | Age: 83
End: 2018-12-03

## 2018-12-03 DIAGNOSIS — E03.9 ACQUIRED HYPOTHYROIDISM: ICD-10-CM

## 2018-12-03 DIAGNOSIS — E78.2 MIXED HYPERLIPIDEMIA: Primary | ICD-10-CM

## 2018-12-03 PROBLEM — I71.4 ABDOMINAL ANEURYSM (HCC): Status: ACTIVE | Noted: 2018-12-03

## 2018-12-03 PROBLEM — R91.8 LUNG INFILTRATE ON CT: Status: RESOLVED | Noted: 2018-08-30 | Resolved: 2018-12-03

## 2018-12-03 RX ORDER — ATORVASTATIN CALCIUM 10 MG/1
10 TABLET, FILM COATED ORAL DAILY
Qty: 90 TABLET | Refills: 3 | Status: SHIPPED | OUTPATIENT
Start: 2018-12-03

## 2018-12-03 RX ORDER — LEVOTHYROXINE SODIUM 0.07 MG/1
75 TABLET ORAL DAILY
Qty: 90 TABLET | Refills: 3 | Status: SHIPPED | OUTPATIENT
Start: 2018-12-03

## 2019-01-02 ENCOUNTER — TRANSCRIBE ORDERS (OUTPATIENT)
Dept: ADMINISTRATIVE | Facility: HOSPITAL | Age: 84
End: 2019-01-02

## 2019-01-02 DIAGNOSIS — M54.5 LOW BACK PAIN, UNSPECIFIED BACK PAIN LATERALITY, UNSPECIFIED CHRONICITY, WITH SCIATICA PRESENCE UNSPECIFIED: ICD-10-CM

## 2019-01-02 DIAGNOSIS — T14.8XXA COMPRESSION INJURY OF NERVE: Primary | ICD-10-CM

## 2019-01-02 DIAGNOSIS — M54.6 PAIN IN THORACIC SPINE: ICD-10-CM

## 2019-01-11 ENCOUNTER — TELEPHONE (OUTPATIENT)
Dept: FAMILY MEDICINE CLINIC | Facility: CLINIC | Age: 84
End: 2019-01-11

## 2019-01-11 NOTE — TELEPHONE ENCOUNTER
I talked with Mr Josef Mackenzie regarding the CT thorax which showed a dilated ascending aortta, suggested correlation with echo to evaluate for any aortic valvular disease  He doesn't want to do other studies now like a f/u ECHO because has too many issues at this time and is in pain, which is being treated  Has had multiple rib and other fractures  Dr Polly Cruz in [place] takes care of his prednisone for his temporal arteritis, but is 'softening his bones'  Has an MRI lumbar spine scheduled for tomorrow  Has difficulty laying on back  Just started Forteo  injections to rebuild bone  I encouraged him to RTO within a month  to review all of above and check PSA and decide on next steps for ascending aortic dilitation  Needs Ab for tracheostomy

## 2019-01-14 ENCOUNTER — HOSPITAL ENCOUNTER (OUTPATIENT)
Dept: RADIOLOGY | Facility: HOSPITAL | Age: 84
Discharge: HOME | End: 2019-01-14
Payer: COMMERCIAL

## 2019-01-14 DIAGNOSIS — T14.8XXA COMPRESSION INJURY OF NERVE: ICD-10-CM

## 2019-01-14 DIAGNOSIS — M54.6 PAIN IN THORACIC SPINE: ICD-10-CM

## 2019-01-14 DIAGNOSIS — M54.5 LOW BACK PAIN, UNSPECIFIED BACK PAIN LATERALITY, UNSPECIFIED CHRONICITY, WITH SCIATICA PRESENCE UNSPECIFIED: ICD-10-CM

## 2019-01-14 PROCEDURE — 72100 X-RAY EXAM L-S SPINE 2/3 VWS: CPT

## 2019-01-14 PROCEDURE — 72072 X-RAY EXAM THORAC SPINE 3VWS: CPT

## 2019-01-28 DIAGNOSIS — I10 BENIGN ESSENTIAL HYPERTENSION: ICD-10-CM

## 2019-01-28 DIAGNOSIS — R97.20 ELEVATED PROSTATE SPECIFIC ANTIGEN (PSA): ICD-10-CM

## 2019-01-28 DIAGNOSIS — E78.2 MIXED HYPERLIPIDEMIA: ICD-10-CM

## 2019-01-28 DIAGNOSIS — C32.1 MALIGNANT NEOPLASM OF SUPRAGLOTTIS (HCC): ICD-10-CM

## 2019-01-28 DIAGNOSIS — Z93.0 TRACHEOSTOMY IN PLACE (HCC): Primary | ICD-10-CM

## 2019-01-28 DIAGNOSIS — J43.2 CENTRILOBULAR EMPHYSEMA (HCC): ICD-10-CM

## 2019-01-28 DIAGNOSIS — M48.54XA COMPRESSION FRACTURE OF THORACIC SPINE, NON-TRAUMATIC, INITIAL ENCOUNTER (HCC): ICD-10-CM

## 2019-01-28 DIAGNOSIS — M31.6 TEMPORAL ARTERITIS (HCC): ICD-10-CM

## 2019-01-28 DIAGNOSIS — E03.9 PRIMARY HYPOTHYROIDISM: ICD-10-CM

## 2019-02-06 LAB
CHOLEST SERPL-MCNC: 199 MG/DL
CHOLEST/HDLC SERPL: 2.8 (CALC)
CRP SERPL HS-MCNC: 3.8 MG/L
ERYTHROCYTE [SEDIMENTATION RATE] IN BLOOD BY WESTERGREN METHOD: 2 MM/H
HDLC SERPL-MCNC: 72 MG/DL
LDLC SERPL CALC-MCNC: 105 MG/DL (CALC)
NONHDLC SERPL-MCNC: 127 MG/DL (CALC)
TRIGL SERPL-MCNC: 128 MG/DL
TSH SERPL-ACNC: 3.65 MIU/L (ref 0.4–4.5)

## 2019-02-15 ENCOUNTER — OFFICE VISIT (OUTPATIENT)
Dept: FAMILY MEDICINE CLINIC | Facility: CLINIC | Age: 84
End: 2019-02-15
Payer: COMMERCIAL

## 2019-02-15 VITALS
OXYGEN SATURATION: 95 % | HEART RATE: 81 BPM | TEMPERATURE: 97.9 F | BODY MASS INDEX: 23.39 KG/M2 | WEIGHT: 149 LBS | DIASTOLIC BLOOD PRESSURE: 84 MMHG | SYSTOLIC BLOOD PRESSURE: 128 MMHG | HEIGHT: 67 IN | RESPIRATION RATE: 16 BRPM

## 2019-02-15 DIAGNOSIS — Z23 ENCOUNTER FOR IMMUNIZATION: ICD-10-CM

## 2019-02-15 DIAGNOSIS — C32.1 MALIGNANT NEOPLASM OF SUPRAGLOTTIS (HCC): ICD-10-CM

## 2019-02-15 DIAGNOSIS — J43.2 CENTRILOBULAR EMPHYSEMA (HCC): ICD-10-CM

## 2019-02-15 DIAGNOSIS — G62.0 DRUG-INDUCED POLYNEUROPATHY (HCC): ICD-10-CM

## 2019-02-15 DIAGNOSIS — I35.0 AORTIC VALVE STENOSIS, ETIOLOGY OF CARDIAC VALVE DISEASE UNSPECIFIED: ICD-10-CM

## 2019-02-15 DIAGNOSIS — M54.6 ACUTE RIGHT-SIDED THORACIC BACK PAIN: ICD-10-CM

## 2019-02-15 DIAGNOSIS — E03.9 PRIMARY HYPOTHYROIDISM: Primary | ICD-10-CM

## 2019-02-15 DIAGNOSIS — I10 ESSENTIAL HYPERTENSION: ICD-10-CM

## 2019-02-15 DIAGNOSIS — I34.0 MITRAL VALVE INSUFFICIENCY, UNSPECIFIED ETIOLOGY: ICD-10-CM

## 2019-02-15 DIAGNOSIS — Z93.0 TRACHEOSTOMY IN PLACE (HCC): ICD-10-CM

## 2019-02-15 DIAGNOSIS — M48.54XA COMPRESSION FRACTURE OF THORACIC SPINE, NON-TRAUMATIC, INITIAL ENCOUNTER (HCC): ICD-10-CM

## 2019-02-15 DIAGNOSIS — I71.4 ABDOMINAL ANEURYSM (HCC): ICD-10-CM

## 2019-02-15 PROCEDURE — G0008 ADMIN INFLUENZA VIRUS VAC: HCPCS | Performed by: FAMILY MEDICINE

## 2019-02-15 PROCEDURE — 90686 IIV4 VACC NO PRSV 0.5 ML IM: CPT | Performed by: FAMILY MEDICINE

## 2019-02-15 PROCEDURE — 1160F RVW MEDS BY RX/DR IN RCRD: CPT | Performed by: FAMILY MEDICINE

## 2019-02-15 PROCEDURE — 3079F DIAST BP 80-89 MM HG: CPT | Performed by: FAMILY MEDICINE

## 2019-02-15 PROCEDURE — 99214 OFFICE O/P EST MOD 30 MIN: CPT | Performed by: FAMILY MEDICINE

## 2019-02-15 PROCEDURE — 90732 PPSV23 VACC 2 YRS+ SUBQ/IM: CPT | Performed by: FAMILY MEDICINE

## 2019-02-15 PROCEDURE — 1036F TOBACCO NON-USER: CPT | Performed by: FAMILY MEDICINE

## 2019-02-15 PROCEDURE — 4040F PNEUMOC VAC/ADMIN/RCVD: CPT | Performed by: FAMILY MEDICINE

## 2019-02-15 PROCEDURE — 3074F SYST BP LT 130 MM HG: CPT | Performed by: FAMILY MEDICINE

## 2019-02-15 PROCEDURE — G0009 ADMIN PNEUMOCOCCAL VACCINE: HCPCS | Performed by: FAMILY MEDICINE

## 2019-02-15 NOTE — ASSESSMENT & PLAN NOTE
2009, likely from smoking  Stop smoking 12/1/09  Told had 'clean bill of health' as relates to this cancer in 2011       ENT is Dr Steffanie Robertson, [place]    Oncologists: Dr April Landon for Chemo and Dr Chyna Ramirez for radiation oncology - had 43 radiation treatments

## 2019-02-15 NOTE — PROGRESS NOTES
SUBJECTIVE:  2/15/2019 Dave Sales MD  I have identified this patient to be Patricia Chavez,  -1935, MR# [id number]    Chief complaint: Here for general check in  Last year lost 20 lbs unintended  Has gained back about 10 lbs  He notices his abdomen has been gradually enlarged over 3 years  History of Present Illness:    Review of systems:  No fever/chills/sweats/unexplained weight loss  No shortness of breatah  No change in digestion or bowel movements except for occas constipation  No change in urination  No new  neurological symptoms      Chart reviewed for relevant medical, surgical and psychosocial history, medications and allergies and updated extensively    Patient Active Problem List   Diagnosis    Aortic stenosis    Benign essential hypertension    Neck pain    Elevated prostate specific antigen (PSA)    Hyperlipidemia    Malignant neoplasm of supraglottis (HCC)    Low back pain    Mitral regurgitation    Plantar fasciitis    Primary hypothyroidism    PVCs (premature ventricular contractions)    Right knee pain    Systolic murmur    Throat pain in adult    Temporal arteritis (HCC)    Fatigue    Elevated troponin    Elevated brain natriuretic peptide (BNP) level    Hypertension    Hypokalemia    Centrilobular emphysema (HCC)    Acute right-sided thoracic back pain    Compression fracture of thoracic spine, non-traumatic, initial encounter ([facility] [address] )    Tracheostomy in place ([facility] [address] )    Abdominal aneurysm (HCC)       EXAM:  /84 (BP Location: Left arm, Patient Position: Sitting, Cuff Size: Standard)   Pulse 81   Temp 97 9 °F (36 6 °C) (Tympanic)   Resp 16   Ht 5' 7" (1 702 m)   Wt 67 6 kg (149 lb)   SpO2 95%   BMI 23 34 kg/m²   The patient appears well, in no apparent distress  Alert and oriented times three, pleasant and cooperative  Vital signs are as noted by the nurse         Eyes: well perfused conjunctiva, not clinically pale, not jaundiced  Ears: normal non hyperemic bilateral tympanic membranes and external ear canals, mild cerumen debris noted/ wax impaction, no otalgia  Nose: nares normal, no rhinorrhea  Oropharynx: No tonsillar enlargement or exudates, moist mucous membranes  Neck: limited AROM, tracheostomy in place  No cervical lymphadenopathy  Lungs: clear to auscultation and percussion  Heart: Regular rate and rhythm, no gallop, + 3/6 systolic L sided  murmur  Abdomen: soft, non-tender  Is protuberant or  Distended but no palpable masses  , no guarding, rebound, normal bowel sounds  Skin: good capillary refill,no rashes noted  Extremities: Good power and tone all extremities bilaterally  No pedal edema      ASSESSMENT/PLAN:  1  Primary hypothyroidism  Clinically stable, will keep Levothyroxine same though TSH high-normal    2  Malignant neoplasm of supraglottis (HCC)  Apparently in remission    3  Centrilobular emphysema (HCC)  stable    4  Aortic valve stenosis, etiology of cardiac valve disease unspecified  Mild per last echo    5  Mitral valve insufficiency, unspecified etiology  See echo    6  Essential hypertension  Stable, same rx  Healthy diet    7  Abdominal aneurysm (HCC)  Mild, see CT scan    8  Drug-induced polyneuropathy (HCC)  Stable, from chemo    9  Compression fracture of thoracic spine, non-traumatic, initial encounter ([facility] [address] )  From prednisone, now on Forteo    10  Tracheostomy in place ([facility] [address] )  stable    11  Acute right-sided thoracic back pain  May be from rib fx or strain  Monitor    12  Protuberant abdomen is chronic, with a benign CT of abd/pelvis  Monitor         F/u 1 - 2 months

## 2019-02-15 NOTE — ASSESSMENT & PLAN NOTE
ECHO: SUMMARY:  Compared with prior echo, improved aortic velocities which suggest mild Aortic stenosis with normal EF

## 2019-04-26 ENCOUNTER — OFFICE VISIT (OUTPATIENT)
Dept: FAMILY MEDICINE CLINIC | Facility: CLINIC | Age: 84
End: 2019-04-26
Payer: COMMERCIAL

## 2019-04-26 VITALS
HEART RATE: 78 BPM | RESPIRATION RATE: 22 BRPM | DIASTOLIC BLOOD PRESSURE: 62 MMHG | OXYGEN SATURATION: 95 % | SYSTOLIC BLOOD PRESSURE: 112 MMHG | TEMPERATURE: 97.2 F

## 2019-04-26 DIAGNOSIS — I10 BENIGN ESSENTIAL HYPERTENSION: ICD-10-CM

## 2019-04-26 DIAGNOSIS — I34.0 MITRAL VALVE INSUFFICIENCY, UNSPECIFIED ETIOLOGY: ICD-10-CM

## 2019-04-26 DIAGNOSIS — M31.6 TEMPORAL ARTERITIS (HCC): ICD-10-CM

## 2019-04-26 DIAGNOSIS — I10 ESSENTIAL HYPERTENSION: ICD-10-CM

## 2019-04-26 DIAGNOSIS — Z93.0 TRACHEOSTOMY IN PLACE (HCC): ICD-10-CM

## 2019-04-26 DIAGNOSIS — I35.0 AORTIC VALVE STENOSIS, ETIOLOGY OF CARDIAC VALVE DISEASE UNSPECIFIED: ICD-10-CM

## 2019-04-26 DIAGNOSIS — I71.4 ABDOMINAL ANEURYSM (HCC): ICD-10-CM

## 2019-04-26 DIAGNOSIS — M48.54XA COMPRESSION FRACTURE OF THORACIC SPINE, NON-TRAUMATIC, INITIAL ENCOUNTER (HCC): ICD-10-CM

## 2019-04-26 DIAGNOSIS — C32.1 MALIGNANT NEOPLASM OF SUPRAGLOTTIS (HCC): ICD-10-CM

## 2019-04-26 DIAGNOSIS — J43.2 CENTRILOBULAR EMPHYSEMA (HCC): Primary | ICD-10-CM

## 2019-04-26 PROBLEM — M54.2 NECK PAIN: Status: RESOLVED | Noted: 2018-01-12 | Resolved: 2019-04-26

## 2019-04-26 PROBLEM — R77.8 ELEVATED TROPONIN: Status: RESOLVED | Noted: 2018-05-31 | Resolved: 2019-04-26

## 2019-04-26 PROCEDURE — 99214 OFFICE O/P EST MOD 30 MIN: CPT | Performed by: FAMILY MEDICINE

## 2019-04-26 RX ORDER — BROMFENAC 0.76 MG/ML
SOLUTION/ DROPS OPHTHALMIC
Refills: 1 | COMMUNITY
Start: 2019-04-24 | End: 2019-05-06 | Stop reason: HOSPADM

## 2019-04-26 RX ORDER — GATIFLOXACIN 5 MG/ML
SOLUTION/ DROPS OPHTHALMIC
Refills: 1 | COMMUNITY
Start: 2019-04-24 | End: 2019-05-06 | Stop reason: HOSPADM

## 2019-04-26 RX ORDER — PREDNISOLONE ACETATE 10 MG/ML
SUSPENSION/ DROPS OPHTHALMIC
Refills: 1 | COMMUNITY
Start: 2019-04-24 | End: 2019-06-28

## 2019-05-06 ENCOUNTER — HOSPITAL ENCOUNTER (OUTPATIENT)
Facility: AMBULARY SURGERY CENTER | Age: 84
Setting detail: OUTPATIENT SURGERY
Discharge: HOME | End: 2019-05-06
Attending: OPHTHALMOLOGY | Admitting: OPHTHALMOLOGY
Payer: COMMERCIAL

## 2019-05-06 ENCOUNTER — ANESTHESIA (OUTPATIENT)
Dept: PERIOP | Facility: AMBULARY SURGERY CENTER | Age: 84
End: 2019-05-06
Payer: COMMERCIAL

## 2019-05-06 ENCOUNTER — ANESTHESIA EVENT (OUTPATIENT)
Dept: PERIOP | Facility: AMBULARY SURGERY CENTER | Age: 84
End: 2019-05-06
Payer: COMMERCIAL

## 2019-05-06 VITALS
HEART RATE: 67 BPM | TEMPERATURE: 95.7 F | WEIGHT: 147 LBS | DIASTOLIC BLOOD PRESSURE: 102 MMHG | RESPIRATION RATE: 16 BRPM | BODY MASS INDEX: 23.02 KG/M2 | SYSTOLIC BLOOD PRESSURE: 167 MMHG | OXYGEN SATURATION: 95 %

## 2019-05-06 DIAGNOSIS — H25.12 AGE-RELATED NUCLEAR CATARACT OF LEFT EYE: Primary | ICD-10-CM

## 2019-05-06 PROCEDURE — V2632 POST CHMBR INTRAOCULAR LENS: HCPCS | Performed by: OPHTHALMOLOGY

## 2019-05-06 DEVICE — IOL SN60WF 19.5: Type: IMPLANTABLE DEVICE | Site: EYE | Status: FUNCTIONAL

## 2019-05-06 RX ORDER — TETRACAINE HYDROCHLORIDE 5 MG/ML
1 SOLUTION OPHTHALMIC ONCE
Status: COMPLETED | OUTPATIENT
Start: 2019-05-06 | End: 2019-05-06

## 2019-05-06 RX ORDER — PREDNISONE 1 MG/1
2 TABLET ORAL DAILY
COMMUNITY

## 2019-05-06 RX ORDER — BALANCED SALT SOLUTION 6.4; .75; .48; .3; 3.9; 1.7 MG/ML; MG/ML; MG/ML; MG/ML; MG/ML; MG/ML
SOLUTION OPHTHALMIC AS NEEDED
Status: DISCONTINUED | OUTPATIENT
Start: 2019-05-06 | End: 2019-05-06 | Stop reason: HOSPADM

## 2019-05-06 RX ORDER — GATIFLOXACIN 5 MG/ML
SOLUTION/ DROPS OPHTHALMIC AS NEEDED
Status: DISCONTINUED | OUTPATIENT
Start: 2019-05-06 | End: 2019-05-06 | Stop reason: HOSPADM

## 2019-05-06 RX ORDER — PHENYLEPHRINE HCL 2.5 %
1 DROPS OPHTHALMIC (EYE)
Status: COMPLETED | OUTPATIENT
Start: 2019-05-06 | End: 2019-05-06

## 2019-05-06 RX ORDER — GATIFLOXACIN 5 MG/ML
1 SOLUTION/ DROPS OPHTHALMIC 2 TIMES DAILY
Qty: 3 ML | Refills: 0
Start: 2019-05-06 | End: 2019-06-28

## 2019-05-06 RX ORDER — MIDAZOLAM HYDROCHLORIDE 1 MG/ML
INJECTION INTRAMUSCULAR; INTRAVENOUS AS NEEDED
Status: DISCONTINUED | OUTPATIENT
Start: 2019-05-06 | End: 2019-05-06 | Stop reason: SURG

## 2019-05-06 RX ORDER — LIDOCAINE HYDROCHLORIDE 20 MG/ML
1 JELLY TOPICAL
Status: COMPLETED | OUTPATIENT
Start: 2019-05-06 | End: 2019-05-06

## 2019-05-06 RX ORDER — KETOROLAC TROMETHAMINE 5 MG/ML
1 SOLUTION OPHTHALMIC
Status: COMPLETED | OUTPATIENT
Start: 2019-05-06 | End: 2019-05-06

## 2019-05-06 RX ORDER — LIDOCAINE HYDROCHLORIDE 10 MG/ML
INJECTION, SOLUTION EPIDURAL; INFILTRATION; INTRACAUDAL; PERINEURAL AS NEEDED
Status: DISCONTINUED | OUTPATIENT
Start: 2019-05-06 | End: 2019-05-06 | Stop reason: HOSPADM

## 2019-05-06 RX ORDER — TETRACAINE HYDROCHLORIDE 5 MG/ML
SOLUTION OPHTHALMIC AS NEEDED
Status: DISCONTINUED | OUTPATIENT
Start: 2019-05-06 | End: 2019-05-06 | Stop reason: HOSPADM

## 2019-05-06 RX ORDER — CYCLOPENTOLATE HYDROCHLORIDE 10 MG/ML
1 SOLUTION/ DROPS OPHTHALMIC
Status: COMPLETED | OUTPATIENT
Start: 2019-05-06 | End: 2019-05-06

## 2019-05-06 RX ADMIN — PHENYLEPHRINE HYDROCHLORIDE 1 DROP: 25 SOLUTION/ DROPS OPHTHALMIC at 11:45

## 2019-05-06 RX ADMIN — LIDOCAINE HYDROCHLORIDE 1 APPLICATION: 20 JELLY TOPICAL at 11:30

## 2019-05-06 RX ADMIN — LIDOCAINE HYDROCHLORIDE 1 APPLICATION: 20 JELLY TOPICAL at 11:47

## 2019-05-06 RX ADMIN — CYCLOPENTOLATE HYDROCHLORIDE 1 DROP: 10 SOLUTION/ DROPS OPHTHALMIC at 12:00

## 2019-05-06 RX ADMIN — CYCLOPENTOLATE HYDROCHLORIDE 1 DROP: 10 SOLUTION/ DROPS OPHTHALMIC at 11:15

## 2019-05-06 RX ADMIN — LIDOCAINE HYDROCHLORIDE 1 APPLICATION: 20 JELLY TOPICAL at 12:01

## 2019-05-06 RX ADMIN — PHENYLEPHRINE HYDROCHLORIDE 1 DROP: 25 SOLUTION/ DROPS OPHTHALMIC at 12:02

## 2019-05-06 RX ADMIN — LIDOCAINE HYDROCHLORIDE 1 APPLICATION: 20 JELLY TOPICAL at 11:15

## 2019-05-06 RX ADMIN — TETRACAINE HYDROCHLORIDE 1 DROP: 5 SOLUTION OPHTHALMIC at 11:15

## 2019-05-06 RX ADMIN — PHENYLEPHRINE HYDROCHLORIDE 1 DROP: 25 SOLUTION/ DROPS OPHTHALMIC at 11:15

## 2019-05-06 RX ADMIN — CYCLOPENTOLATE HYDROCHLORIDE 1 DROP: 10 SOLUTION/ DROPS OPHTHALMIC at 11:30

## 2019-05-06 RX ADMIN — KETOROLAC TROMETHAMINE 1 DROP: 5 SOLUTION OPHTHALMIC at 12:00

## 2019-05-06 RX ADMIN — KETOROLAC TROMETHAMINE 1 DROP: 5 SOLUTION OPHTHALMIC at 11:45

## 2019-05-06 RX ADMIN — KETOROLAC TROMETHAMINE 1 DROP: 5 SOLUTION OPHTHALMIC at 11:15

## 2019-05-06 RX ADMIN — PHENYLEPHRINE HYDROCHLORIDE 1 DROP: 25 SOLUTION/ DROPS OPHTHALMIC at 11:30

## 2019-05-06 RX ADMIN — CYCLOPENTOLATE HYDROCHLORIDE 1 DROP: 10 SOLUTION/ DROPS OPHTHALMIC at 11:45

## 2019-05-06 RX ADMIN — KETOROLAC TROMETHAMINE 1 DROP: 5 SOLUTION OPHTHALMIC at 11:30

## 2019-05-06 RX ADMIN — MIDAZOLAM HYDROCHLORIDE 2 MG: 1 INJECTION, SOLUTION INTRAMUSCULAR; INTRAVENOUS at 11:54

## 2019-06-28 ENCOUNTER — OFFICE VISIT (OUTPATIENT)
Dept: FAMILY MEDICINE CLINIC | Facility: CLINIC | Age: 84
End: 2019-06-28
Payer: COMMERCIAL

## 2019-06-28 VITALS
RESPIRATION RATE: 18 BRPM | OXYGEN SATURATION: 93 % | SYSTOLIC BLOOD PRESSURE: 80 MMHG | WEIGHT: 138 LBS | HEART RATE: 76 BPM | DIASTOLIC BLOOD PRESSURE: 50 MMHG | BODY MASS INDEX: 21.61 KG/M2 | TEMPERATURE: 97.6 F

## 2019-06-28 DIAGNOSIS — C32.1 MALIGNANT NEOPLASM OF SUPRAGLOTTIS (HCC): ICD-10-CM

## 2019-06-28 DIAGNOSIS — M31.6 TEMPORAL ARTERITIS (HCC): Primary | ICD-10-CM

## 2019-06-28 DIAGNOSIS — Z93.0 TRACHEOSTOMY IN PLACE (HCC): ICD-10-CM

## 2019-06-28 DIAGNOSIS — I10 ESSENTIAL HYPERTENSION: ICD-10-CM

## 2019-06-28 DIAGNOSIS — M48.54XA COMPRESSION FRACTURE OF THORACIC SPINE, NON-TRAUMATIC, INITIAL ENCOUNTER (HCC): ICD-10-CM

## 2019-06-28 DIAGNOSIS — J43.2 CENTRILOBULAR EMPHYSEMA (HCC): ICD-10-CM

## 2019-06-28 DIAGNOSIS — E03.9 PRIMARY HYPOTHYROIDISM: ICD-10-CM

## 2019-06-28 PROBLEM — I34.0 MITRAL REGURGITATION: Status: RESOLVED | Noted: 2017-11-17 | Resolved: 2019-06-28

## 2019-06-28 PROCEDURE — 1036F TOBACCO NON-USER: CPT | Performed by: FAMILY MEDICINE

## 2019-06-28 PROCEDURE — 3074F SYST BP LT 130 MM HG: CPT | Performed by: FAMILY MEDICINE

## 2019-06-28 PROCEDURE — 3078F DIAST BP <80 MM HG: CPT | Performed by: FAMILY MEDICINE

## 2019-06-28 PROCEDURE — 99214 OFFICE O/P EST MOD 30 MIN: CPT | Performed by: FAMILY MEDICINE

## 2019-06-28 PROCEDURE — 1160F RVW MEDS BY RX/DR IN RCRD: CPT | Performed by: FAMILY MEDICINE

## 2019-06-28 RX ORDER — METHYLPREDNISOLONE 4 MG/1
TABLET ORAL
Refills: 3 | COMMUNITY
Start: 2019-06-09 | End: 2019-06-28

## 2019-06-28 RX ORDER — CYCLOBENZAPRINE HCL 5 MG
10 TABLET ORAL DAILY
COMMUNITY

## 2019-06-28 NOTE — PROGRESS NOTES
Chief concern and HPI: Jael Wesley is a 81 y o  male  F/u chronic disease  Rheumatologist may plans to use biologic instead of prednisone for temporal arteritis  Actemra self injections  Feeling much better  Osteoporosis leads to hunched posture  Hopes to return to golfing and [employer]  Previous relevant clinical information reviewed from medical, surgical and psychosocial history, medication and allergies and labs/studies  Patient Active Problem List   Diagnosis    Aortic stenosis    Elevated prostate specific antigen (PSA)    Hyperlipidemia    Malignant neoplasm of supraglottis (HCC)    Low back pain    Mitral regurgitation    Plantar fasciitis    Primary hypothyroidism    PVCs (premature ventricular contractions)    Right knee pain    Temporal arteritis (HCC)    Fatigue    Elevated brain natriuretic peptide (BNP) level    Essential hypertension    Hypokalemia    Centrilobular emphysema (HCC)    Acute right-sided thoracic back pain    Compression fracture of thoracic spine, non-traumatic, initial encounter ([facility] )    Tracheostomy in place [facility])    Abdominal aneurysm ([facility] )    Drug-induced polyneuropathy ([facility] )           Review of systems: No new or worsening:   Unexplained fever/chills/sweats/weight loss  HEENT issues such as new ear, mouth, nose or eye problems  No headache now  Respiratory issues such as new shortness of breath, cough  Heart issues such as new chest pain or pressure, palpitations  Exercise tolerance is improving  Abdominal or digestive issues such as diarrhea, constipation or blood in stool    BM's are normal  Genitourinary issues - has tx for large prostate but no change  Neurological issues such as new numbness or motor weakness  Psychological issues such as depression or anxiety  Skin issues such as new rashes      Exam:  Alert, no acute distress BP (!) 80/50   Pulse 76   Temp 97 6 °F (36 4 °C)   Resp 18   Wt 62 6 kg (138 lb)   SpO2 93%   BMI 21 61 kg/m² HEENT: Head normocephalic and atraumatic  No tender temporal arteries  No neck skin breakdown at tracheostomy  Lungs: No respiratory labor  Clear to auscultation  Cardiac: Regular rate and rhythm  No murmur, rub or gallop  Abdomen: Benign  Normal active bowel sounds  Soft and non-tender  No organomegaly  Extremities: No cyanosis, clubbing or edema  Neuro screen: No gross deficits      Summary Impression:  1  Temporal arteritis ([facility] )  Well controlled  2  Essential hypertension  Over controlled  3  Compression fracture of thoracic spine, non-traumatic, initial encounter ([facility] )  On Forteo    4  Tracheostomy in place ([facility] )  stable  - mupirocin (BACTROBAN) 2 % ointment; Apply topically 3 (three) times a day  Dispense: 22 g; Refill: 0    5  Primary hypothyroidism  Clinically stable    6  Centrilobular emphysema (HCC)  Clinically stable    7  Malignant neoplasm of supraglottis (HCC)  Clinically stable        Risks and benefits of therapeutic plan discussed, answered all patient questions and concerns and patient expressed understanding and agreement of therapeutic plan  BMI Counseling: Body mass index is 21 61 kg/m²  Discussed the patient's BMI with him  The BMI is above average  BMI counseling and education was provided to the patient  Nutrition recommendations include 3-5 servings of fruits/vegetables daily        Follow up plan:   Future Appointments   Date Time Provider Amanda Kerr   10/14/2019  9:15 AM Jojo Gomez MD [facility]

## 2019-07-29 NOTE — LETTER
June 20, 2018     Annah Goldberg, MD  [address]    Patient: Debbie Carrillo   YOB: 1935   Date of Visit: 6/20/2018       Dear Dr Eve Helton: Thank you for referring Nicol Patterson to me for evaluation  Below are my notes for this consultation  If you have questions, please do not hesitate to call me  I look forward to following your patient along with you  Sincerely,        Martha Rhodes MD        CC: No Recipients  Martha Rhodes MD  6/20/2018  2:26 PM  Sign at close encounter  Assessment/Plan:       Problem List Items Addressed This Visit        Respiratory    Laryngeal cancer ([address] )    Centrilobular emphysema ([facility] [address] ) - Primary     This is as assessed on CT chest performed in prior hospitalization  Also patient had chronic bronchitis  This is likely secondary to smoking as well  I doubt that the budesonide is causing his back symptoms  If anything the oral prednisone is a big culprit for muscle weakness and fractures  However at this time I do not feel he will require the budesonide further  He is asked to continue to use Duoneb but on a regular basis twice daily  Other    Acute right-sided thoracic back pain     Patient has acute severe right upper back pain  It is reproducible on palpation and he is significant impairment with his breathing due to this  He did try muscle relaxant at home as well as OTC tylenol without any relief  Given chronic steroid use and lack of response to the medications, I recommend he go to the ER to be evaluated/imaged if needed  Patient is agreeable to this at this time  I did call and discuss this with the ER physician  If admitted consider bronchoscopy to assist with mucus clearance due to most recent difficulty due to severe pain    Follow-up depending on hospitalization status  All questions are answered to the patient's satisfaction and understanding  He verbalizes understanding  history and physical exam - UROLOGY    CHIEF COMPLAINT    Chief Complaint   Patient presents with   • Follow-up     kidney stones       Assessment:  1. Symptomatic 10 mm left proximal ureteral stone  2. Asymptomatic bilateral renal stones with history of medullary sponge kidney  3. Medical noncompliance with stone prevention    Plan:  Discussed. She will start tamsulosin 0.4 mg. She is inclined to treat the stone at this time. She was unable to tolerate a stent last year and I would be inclined to try to treat her without a stent. Shockwave lithotripsy might be the best way to do this. Location of the stone may make this difficult. The stone may need to be dislodged proximally. She understands that if I not able to focus the shockwave on the stone, ureteroscopy with a stent may need to be performed.    history of present illness  This is a 35 year old female has a symptomatic 10 mm proximal left ureteral stone. She is not having fever. She is having some intermittent pain with some nausea and vomiting maybe pain medication related. She has been taking Zofran. The pain was quite severe last night.     She has a history of recurrent kidney stones. She is a diagnosis of medullary sponge kidney. She last had a symptomatic stone episode in March. She was not seen at that time but manages this with Flomax. Prior to this in October 2018 she had an 8 mm distal right ureteral stone is managed with a stent. She had terrible intolerance to the stent. This needed to be removed. She subsequently did pass a stone which was calcium phosphate. She completed a 24-hour urine which demonstrated low volume at 1.57 L of elevated urine calcium of 320 and low magnesium. Hydrochlorothiazide 25 mg as well as magnesium oxide 400 mg recommended. She never started these medications.    In addition to the stone she is currently passing, she has bilateral renal stones.        Past Medical History    Past Medical History:   Diagnosis Date   •  He is encouraged to call with any further questions or concerns  Portions of the record may have been created with voice recognition software  Occasional wrong word or "sound a like" substitutions may have occurred due to the inherent limitations of voice recognition software  Read the chart carefully and recognize, using context, where substitutions have occurred  Electronically Signed by Jay Mendez MD    ______________________________________________________________________    Chief Complaint:   Chief Complaint   Patient presents with   [facility] F/U     [address]    Medication Dose Change        Patient ID: Cami Sorensen is a 81 y o  y o  male has a past medical history of Arthritis; Cancer ([address] ); Cough; Disease of thyroid gland; Emphysema lung ([address] ); Heart murmur; SOB (shortness of breath); Temporal arteritis ([address] ); Throat cancer ([facility] [address] ); and Thyroid disease  6/20/2018  Patient presents today for initial visit post hospitalization  He presents today with his son  He states that over the last week he has been having excruciating right upper back pain and feels like his right arm is swollen  He denies any trauma  Due to this back pain he is unable to cough like he used to  Therefore he is experiencing extreme mucus plugging to the point he has to pull out his trach in order to breathe  Then he is able to bring up thick gobs of mucus  The pain arose out of his sleep  It is sharp and 8/10  No fevers chills or night sweats  Upon previous discharge, he was placed on Duoneb as needed and Pulmicort  He has been using the pulmicort regularly and initially for the first week he states that this helped him  However since developing this back pain he feels worse  He wonders if the pulmicort is causing his weakness/muscle pain  He remains on Prednisone 30 mg at this time for his temporal arteritis and this is being weaned slowly  This was diagnosed in February       He does have a Kidney stone    • Medullary sponge kidney        PAST Surgical history    Past Surgical History:   Procedure Laterality Date   • Ureteral stent placement Right 10/2018       Outpatient Medications  Outpatient Medications Marked as Taking for the 7/29/19 encounter (Office Visit) with Kuldip Prince MD   Medication Sig Dispense Refill   • tamsulosin (FLOMAX) 0.4 MG Cap Take 1 capsule by mouth daily after a meal. ONE HALF HOUR AFTER EVENING MEAL 14 capsule 0       Allergies  ALLERGIES:   Allergen Reactions   • Amoxicillin-Pot Clavulanate HIVES   • Codeine NAUSEA   • Ibuprofen HIVES       Family history  History reviewed. No pertinent family history.    Social history  Social History     Socioeconomic History   • Marital status: /Civil Union     Spouse name: Not on file   • Number of children: Not on file   • Years of education: Not on file   • Highest education level: Not on file   Occupational History   • Occupation: Homemaker   Social Needs   • Financial resource strain: Not on file   • Food insecurity:     Worry: Not on file     Inability: Not on file   • Transportation needs:     Medical: Not on file     Non-medical: Not on file   Tobacco Use   • Smoking status: Former Smoker     Types: Cigarettes     Last attempt to quit: 10/3/2008     Years since quitting: 10.8   • Smokeless tobacco: Never Used   Substance and Sexual Activity   • Alcohol use: Yes     Comment: Socially   • Drug use: No   • Sexual activity: Not on file   Lifestyle   • Physical activity:     Days per week: Not on file     Minutes per session: Not on file   • Stress: Not on file   Relationships   • Social connections:     Talks on phone: Not on file     Gets together: Not on file     Attends [employer] service: Not on file     Active member of club or organization: Not on file     Attends meetings of clubs or organizations: Not on file     Relationship status: Not on file   • Intimate partner violence:     Fear of current or ex partner: Not  history of lower back pain and sees a chiropractor  When he saw his chiropractor last week for this pain he was told that he will need seek medical attention  He was also prescribed with Protonix upon discharge due to chronic GERD and gricel reflux of food and contents that also comes out of his trach  He states that despite the protonix he still has this problem  He has a history of laryngeal cancer and is s/p trach about 9 years ago  Since then he has had difficulty with excess mucus and chronic bronchitis  He goes through multiple tissues per day  Review of Systems   Constitutional: Positive for activity change and fatigue  Negative for fever and unexpected weight change  HENT: Positive for congestion  Eyes: Negative for visual disturbance  Respiratory: Positive for cough, chest tightness and shortness of breath  Cardiovascular: Negative for chest pain and leg swelling  Gastrointestinal: Positive for nausea  Negative for abdominal distention and diarrhea  Endocrine: Negative for polyuria  Genitourinary: Negative for difficulty urinating  Musculoskeletal: Positive for arthralgias and back pain  Skin: Negative for color change and pallor  Allergic/Immunologic: Positive for immunocompromised state (chronic steroids)  Negative for environmental allergies  Neurological: Negative for dizziness, weakness and light-headedness  Hematological: Negative for adenopathy  Psychiatric/Behavioral: Negative for agitation and behavioral problems  Social history: He reports that he quit smoking about 9 years ago  He has a 60 00 pack-year smoking history  He has never used smokeless tobacco  He reports that he drinks alcohol  He reports that he does not use drugs      Past surgical history:   Past Surgical History:   Procedure Laterality Date    APPENDECTOMY      TRACHEOSTOMY       Family history:   Family History   Problem Relation Age of Onset    Diabetes Family     Arthritis Family on file     Emotionally abused: Not on file     Physically abused: Not on file     Forced sexual activity: Not on file   Other Topics Concern   • Not on file   Social History Narrative   • Not on file       Review of Systems  Constitutional:  Denies fever, chills, unwanted weight loss.  Endocrine:  Denies polydipsia.  Cardiovascular:  Denies chest pain  Respiratory:  Denies cough or shortness of breath  Genitourinary:  See HPI.  has had a vasectomy.  Gastrointestinal:  Denies abdominal pain, nausea, vomiting, constipation or diarrhea  Musculoskeletal:  Denies back pain or joint pain  Lymphatic:  Denies swollen glands  Neurologic:  Denies headache, focal weakness or sensory changes  Integument:  Denies rash  Psychiatric:  Denies depression or anxiety      PHYSICIAL EXAM  Vital Signs:   Visit Vitals  Resp 16   Ht 5' 6\" (1.676 m)   Wt 59 kg   BMI 20.98 kg/m²     General: The patient is well developed, well nourished, in no acute distress.  HEENT: Normocephalic  Neck: Midline trachea. No adenopathy.  Respiratory: Respiratory effort normal. Clear to auscultation.  Cardiovascular: Regular rate and rhythm. No murmurs.  Back: No costovertebral angle tenderness.  Extremities:  No swelling or tenderness.  Abdomen:  Not obese, soft non-tender w/o masses or hepatosplenomegaly. No obvious hernias. No distension.  Genitourinary: Deferred  Neurologic: Alert. Normal mood and affect. No gross focal neurologic findings.  Skin: Warm and dry. Skin where seen w/o rashes or lesions.      STUDIES  Laboratory Results:    Recent Labs     07/29/19  1637   WBC 8.7   RBC 3.95*   HCT 36.8   HGB 12.1          Urinalysis:  Lab Results   Component Value Date    USPG <=1.005 07/29/2019    UPROT negative 07/29/2019    UWBC trace 07/29/2019    URBC trace-intact 07/29/2019    UBILI negative 07/29/2019    UPH 6.5 07/29/2019    UROB 0.2 E.U./dL 07/29/2019    UBACTR few 10/19/2018       Kuldip Prince MD    Cancer Family     Hypertension Family     Heart disease Family        There is no immunization history for the selected administration types on file for this patient  No current facility-administered medications for this visit  Current Outpatient Prescriptions   Medication Sig Dispense Refill    aspirin 81 mg chewable tablet Chew 1 tablet (81 mg total) daily 30 tablet 3    atorvastatin (LIPITOR) 10 mg tablet Take 1 tablet by mouth daily      budesonide (PULMICORT) 0 5 mg/2 mL nebulizer solution Take 1 vial (0 5 mg total) by nebulization every 12 (twelve) hours 60 mL 0    levothyroxine 75 mcg tablet Take 1 tablet by mouth daily      Multiple Vitamins-Minerals (ICAPS AREDS 2 PO) Apply 2 capsules to the mouth or throat 2 (two) times a day      mupirocin (BACTROBAN) 2 % ointment Apply topically 3 (three) times a day 22 g 5    pantoprazole (PROTONIX) 40 mg tablet Take 1 tablet (40 mg total) by mouth daily in the early morning 30 tablet 0    predniSONE 20 mg tablet 4 tabs po od 120 tablet 5     Allergies: Cefdinir and Other    Objective:  Vitals:    06/20/18 0758   BP: 118/88   BP Location: Left arm   Patient Position: Sitting   Cuff Size: Adult   Pulse: 86   Resp: 20   Temp: 97 6 °F (36 4 °C)   TempSrc: Oral   SpO2: 96%   Weight: 72 6 kg (160 lb)   Height: 5' 6" (1 676 m)   Oxygen Therapy  SpO2: 96 %    Wt Readings from Last 3 Encounters:   06/20/18 72 6 kg (160 lb)   06/20/18 72 6 kg (160 lb)   06/01/18 73 3 kg (161 lb 9 6 oz)     Body mass index is 25 82 kg/m²  Physical Exam   Constitutional: He is oriented to person, place, and time  He appears well-nourished  He appears distressed (moderate painful distress)  HENT:   Head: Atraumatic    +tracheostomy in place   Eyes: EOM are normal    Neck: Neck supple  Cardiovascular: Normal rate and regular rhythm  Pulmonary/Chest:   Decreased effort due to pain  Abdominal: Soft  Musculoskeletal: Normal range of motion  He exhibits no edema  Neurological: He is alert and oriented to person, place, and time  Skin: Skin is warm and dry  Psychiatric: He has a normal mood and affect  His behavior is normal    Vitals reviewed        Lab Review: reviewed  Orders Only on 06/18/2018   Component Date Value    SL AMB GLUCOSE 06/18/2018 70     BUN 06/18/2018 26*    Creatinine, Serum 06/18/2018 1 01     eGFR Non  06/18/2018 69     SL AMB EGFR  AMER* 06/18/2018 80     SL AMB BUN/CREATININE RA* 06/18/2018 26*    SL AMB SODIUM 06/18/2018 139     SL AMB POTASSIUM 06/18/2018 4 5     SL AMB CHLORIDE 06/18/2018 101     SL AMB CARBON DIOXIDE 06/18/2018 26     SL AMB CALCIUM 06/18/2018 9 2     SL AMB SED RATE BY MODIF* 06/18/2018 25*    SL AMB LAB WHITE BLOOD C* 06/18/2018 8 1     SL AMB LAB RED BLOOD ANDRIA* 06/18/2018 5 11     Hemoglobin 06/18/2018 15 8     Hematocrit 06/18/2018 47 0     MCV 06/18/2018 92 0     MCH 06/18/2018 30 9     MCHC 06/18/2018 33 6     RDW 06/18/2018 14 9     Platelet Count 67/63/3103 155     SL AMB MPV 06/18/2018 10 9     Neutrophils (Absolute) 06/18/2018 6683     Lymphocytes (Absolute) 06/18/2018 770*    Monocytes (Absolute) 06/18/2018 632     Eosinophils (Absolute) 06/18/2018 8*    Basophils (Absolute) 06/18/2018 8     Neutrophils 06/18/2018 82 5     Lymphocytes 06/18/2018 9 5     Monocytes 06/18/2018 7 8     Eosinophils 06/18/2018 0 1     Basophils 06/18/2018 0 1     C-Reactive Protein, Quant 06/18/2018 45 3*   Admission on 05/31/2018, Discharged on 06/01/2018   Component Date Value    WBC 05/31/2018 8 39     RBC 05/31/2018 4 54     Hemoglobin 05/31/2018 14 0     Hematocrit 05/31/2018 43 4     MCV 05/31/2018 96     MCH 05/31/2018 30 8     MCHC 05/31/2018 32 3     RDW 05/31/2018 16 9*    MPV 05/31/2018 11 4     Platelets 41/69/0220 94*    nRBC 05/31/2018 0     Neutrophils Relative 05/31/2018 79*    Immat GRANS % 05/31/2018 1     Lymphocytes Relative 05/31/2018 10*    Monocytes Relative 05/31/2018 9     Eosinophils Relative 05/31/2018 1     Basophils Relative 05/31/2018 0     Neutrophils Absolute 05/31/2018 6 72     Immature Grans Absolute 05/31/2018 0 05     Lymphocytes Absolute 05/31/2018 0 81     Monocytes Absolute 05/31/2018 0 76     Eosinophils Absolute 05/31/2018 0 05     Basophils Absolute 05/31/2018 0 00     Protime 05/31/2018 10 8     INR 05/31/2018 1 03     PTT 05/31/2018 23*    Sodium 05/31/2018 139     Potassium 05/31/2018 3 2*    Chloride 05/31/2018 104     CO2 05/31/2018 30     Anion Gap 05/31/2018 5     BUN 05/31/2018 26*    Creatinine 05/31/2018 0 94     Glucose 05/31/2018 94     Calcium 05/31/2018 7 9*    AST 05/31/2018 19     ALT 05/31/2018 39     Alkaline Phosphatase 05/31/2018 45*    Total Protein 05/31/2018 5 2*    Albumin 05/31/2018 2 5*    Total Bilirubin 05/31/2018 0 40     eGFR 05/31/2018 75     TSH 3RD GENERATON 05/31/2018 2 130     Cortisol, Random 05/31/2018 42 9     Color, UA 05/31/2018 Yellow     Clarity, UA 05/31/2018 Clear     Specific Gravity, UA 05/31/2018 1 010     pH, UA 05/31/2018 7 0     Leukocytes, UA 05/31/2018 Negative     Nitrite, UA 05/31/2018 Negative     Protein, UA 05/31/2018 Negative     Glucose, UA 05/31/2018 Negative     Ketones, UA 05/31/2018 Negative     Urobilinogen, UA 05/31/2018 0 2     Bilirubin, UA 05/31/2018 Negative     Blood, UA 05/31/2018 Negative     Magnesium 05/31/2018 1 8     Phosphorus 05/31/2018 2 8     NT-proBNP 05/31/2018 776*    Troponin I 05/31/2018 0 06*    MRSA Culture Only 05/31/2018 No Methicillin Resistant Staphlyococcus aureus (MRSA) isolated     Troponin I 05/31/2018 0 04     Troponin I 06/01/2018 0 04     Sodium 06/01/2018 138     Potassium 06/01/2018 4 0     Chloride 06/01/2018 105     CO2 06/01/2018 29     Anion Gap 06/01/2018 4     BUN 06/01/2018 20     Creatinine 06/01/2018 0 86     Glucose 06/01/2018 90     Calcium 06/01/2018 8 0*    eGFR 06/01/2018 81     Magnesium 06/01/2018 2 2     Phosphorus 06/01/2018 3 1     WBC 06/01/2018 6 88     RBC 06/01/2018 4 58     Hemoglobin 06/01/2018 13 8     Hematocrit 06/01/2018 43 4     MCV 06/01/2018 95     MCH 06/01/2018 30 1     MCHC 06/01/2018 31 8     RDW 06/01/2018 17 1*    Platelets 28/26/7477 91*    MPV 06/01/2018 10 5     Cholesterol 06/01/2018 185     Triglycerides 06/01/2018 81     HDL, Direct 06/01/2018 78*    LDL Calculated 06/01/2018 91     Non-HDL-Chol (CHOL-HDL) 06/01/2018 107     Ventricular Rate 06/01/2018 64     Atrial Rate 06/01/2018 64     MI Interval 06/01/2018 140     QRSD Interval 06/01/2018 84     QT Interval 06/01/2018 410     QTC Interval 06/01/2018 422     P Axis 06/01/2018 49     QRS Axis 06/01/2018 9     T Wave Axis 06/01/2018 74     Sputum Culture 06/01/2018 Test not performed  Suggest repeat specimen   Gram Stain Result 06/01/2018 >10 squamous epithelial cells/lpf, indicating orophayngeal contamination   Gram Stain Result 06/01/2018 Rare Polys     Gram Stain Result 06/01/2018 3+ Gram positive cocci in pairs, chains and clusters     Gram Stain Result 06/01/2018 2+ Gram negative rods     Gram Stain Result 06/01/2018 1+ Gram positive rods     Ventricular Rate 05/31/2018 80     Atrial Rate 05/31/2018 80     MI Interval 05/31/2018 134     QRSD Interval 05/31/2018 82     QT Interval 05/31/2018 364     QTC Interval 05/31/2018 419     P Axis 05/31/2018 33     QRS Axis 05/31/2018 -12     T Wave [place] 05/31/2018 114     Protocol Name 06/01/2018 LEXISCAN     Time In Exercise Phase 06/01/2018 00:01:00     MAX   SYSTOLIC BP 29/13/3188 564     Max Diastolic Bp 83/86/8644 98     Max Heart Rate 06/01/2018 75     Max Predicted Heart Rate 06/01/2018 138     Reason for Termination 06/01/2018 PROTOCOL COMPLETED     Test Indication 06/01/2018 SOB     Target Hr Formular 06/01/2018 (220 - Age)*100%     Chest Pain Statement 06/01/2018 none    Orders Only on 05/11/2018   Component Date Value    SL AMB GLUCOSE 05/11/2018 62*    BUN 05/11/2018 26*    Creatinine, Serum 05/11/2018 0 96     eGFR Non African American 05/11/2018 74     SL AMB EGFR  AMER* 05/11/2018 86     SL AMB BUN/CREATININE RA* 05/11/2018 27*    SL AMB SODIUM 05/11/2018 140     SL AMB POTASSIUM 05/11/2018 4 0     SL AMB CHLORIDE 05/11/2018 104     SL AMB CARBON DIOXIDE 05/11/2018 29     SL AMB CALCIUM 05/11/2018 8 6     SL AMB SED RATE BY MODIF* 05/11/2018 6     SL AMB LAB WHITE BLOOD C* 05/11/2018 7 6     SL AMB LAB RED BLOOD ANDRIA* 05/11/2018 5 22     Hemoglobin 05/11/2018 15 8     Hematocrit 05/11/2018 48 0     MCV 05/11/2018 92 0     MCH 05/11/2018 30 3     MCHC 05/11/2018 32 9     RDW 05/11/2018 16 0*    Neutrophils (Absolute) 05/11/2018 6065     Lymphocytes (Absolute) 05/11/2018 699*    Monocytes (Absolute) 05/11/2018 806     Eosinophils (Absolute) 05/11/2018 23     Basophils (Absolute) 05/11/2018 8     Neutrophils 05/11/2018 79 8     Lymphocytes 05/11/2018 9 2     Monocytes 05/11/2018 10 6     Eosinophils 05/11/2018 0 3     Basophils 05/11/2018 0 1     SL AMB COMMENT(S) 05/11/2018 Review of peripheral smear confirms automated results   Platelet Count 64/66/7356 TNP     C-Reactive Protein, Quant 05/11/2018 9 0*       Diagnostics:  I have personally reviewed pertinent reports  and I have personally reviewed pertinent films in PACS    Xr Chest 1 View Portable    Result Date: 5/31/2018  Narrative: CHEST INDICATION:   hypotension  FATIGUE  HYPOTENSION     PT HAD CANCER OF THROAT HAS TRACHAEOSTOMY COMPARISON:  Chest x-ray dated 4/17/2018 EXAM PERFORMED/VIEWS:  XR CHEST PORTABLE FINDINGS: Tracheostomy tube is in the midline, the tip terminates approximately 4 9 cm from the anastasia  The aorta is calcified and tortuous, as before  The cardiac and mediastinal contours otherwise appear unremarkable  The lungs are clear  No pneumothorax or pleural effusion  Previously seen compression fractures in the lower thoracic spine not well redemonstrated, the osseous structures otherwise appear within normal limits for patient age  Impression: No acute cardiopulmonary disease  Other nonacute findings as above   Workstation performed: [id number]

## 2019-09-05 ENCOUNTER — TELEPHONE (OUTPATIENT)
Dept: FAMILY MEDICINE CLINIC | Facility: CLINIC | Age: 84
End: 2019-09-05

## 2019-09-05 NOTE — TELEPHONE ENCOUNTER
[address] [facility]   Called about patient passing away in home and brought to [address] [place] [address] home    He would like a call back to verify medical information   Dr Angelo Davalos away until Monday

## 2019-09-06 ENCOUNTER — TELEPHONE (OUTPATIENT)
Dept: FAMILY MEDICINE CLINIC | Facility: CLINIC | Age: 84
End: 2019-09-06

## 2019-09-06 NOTE — TELEPHONE ENCOUNTER
DR Hemal Lowe  - PT PASSED AWAY ON 9/4/19  SHE WOULD LIKE YOU TO GO ON LINE AND SIGN THE DEATH CERTIFICATE  CASE # IS [id number]  PT WAS PRONOUNCED ON 9/4/19 AT 10:04 AM AT PT'S RESIDENCE   [facility], [facility], [address] [place] [address]  IF YOU HAVE ANY QUESTIONS, SHE SAID YOU CAN CALL HER  SHE SAID IT NEEDS TO BE DONE TODAY AS PT IS BEING BURIED TOMORROW

## 2019-09-09 ENCOUNTER — TELEPHONE (OUTPATIENT)
Dept: FAMILY MEDICINE CLINIC | Facility: CLINIC | Age: 84
End: 2019-09-09

## 2021-05-15 NOTE — MISCELLANEOUS
Message   Recorded as Task   Date: 01/22/2018 03:16 PM, Created By: Shama Palacios   Task Name: Call Back   Assigned To:  Rey Sharma   Regarding Patient: Esteban Heck, Status: Active   Comment:    Magali Cano - 22 Jan 2018 3:16 PM     TASK CREATED  Caller: Self; Results Inquiry; [phone] (Home)  DR GOMEZ PT WAITING FOR RESULTS OF BW DONE LAST WEEK  LAD   Rey Sharma - 23 Jan 2018 7:06 AM     TASK REASSIGNED: Previously Assigned To [facility],team   I spoke to patient tsh nl still having neck pain and headaches did get some relief from nsaids and muscle relaxants has eye appmt next week and is seeing chiropractor rec cont nsaids and call next week      Signatures   Electronically signed by : TOÑO Heller ; Jan 23 2018  2:21PM EST                       (Author)
You can access the FollowMyHealth Patient Portal offered by [facility] by registering at the following website: [email or web address]. By joining [facility]’s FollowMyHealth portal, you will also be able to view your health information using other applications (apps) compatible with our system.

## 2023-11-04 NOTE — NURSING NOTE
Patient discharged to home ambulatory  Patient refused wheelchair or assistance for discharge  AVS education and prescriptions given  IV removed 
Negative

## 2024-11-06 NOTE — PROGRESS NOTES
Patient states he spoke with Dr Kurtis Mcdermott concerning Scan and will schedule in the near future r/t inability to consume sufficient energy/protein 2/2 cirrhosis

## 2025-01-17 NOTE — TELEPHONE ENCOUNTER
Pt came in and requested a phone call from Dr Stan Kay this evening, regarding his meds [phone]

## (undated) DEVICE — CLEARCUT® SLIT KNIFE INTREPID MICRO-COAXIAL SYSTEM 2.4 SB: Brand: CLEARCUT®; INTREPID

## (undated) DEVICE — ACTIVE FMS W/ INTREPID* ULTRA SLEEVES, 0.9MM 45° ABS* INTREPID* BALANCED TIP: Brand: ALCON

## (undated) DEVICE — AIR INJECT CANNULA 27GA: Brand: OPHTHALMIC CANNULA

## (undated) DEVICE — MICROSURGICAL INSTRUMENT IRR. CYSTITOME 25GA STRAIGHT-REVERSE CUTTING: Brand: ALCON

## (undated) DEVICE — THE MONARCH® "D" CARTRIDGE IS A SINGLE-USE POLYPROPYLENE CARTRIDGE FOR POSTERIOR CHAMBER IOL DELIVERY: Brand: MONARCH® III

## (undated) DEVICE — EYE PACK CUSTOM -FINNEGAN

## (undated) DEVICE — GLOVE SRG BIOGEL 7.5

## (undated) DEVICE — INTREPID® TRANSFORMER IA HP: Brand: INTREPID®

## (undated) DEVICE — B-H IRRIGATING CAN 19GA FLAT ANGLED 8MM: Brand: OPHTHALMIC CANNULA